# Patient Record
Sex: MALE | Race: WHITE | Employment: FULL TIME | URBAN - METROPOLITAN AREA
[De-identification: names, ages, dates, MRNs, and addresses within clinical notes are randomized per-mention and may not be internally consistent; named-entity substitution may affect disease eponyms.]

---

## 2017-08-08 LAB
AVERAGE GLUCOSE: 108
BUN BLDV-MCNC: 14 MG/DL
CALCIUM SERPL-MCNC: 9.3 MG/DL
CHLORIDE BLD-SCNC: 104 MMOL/L
CHOLESTEROL, TOTAL: 116 MG/DL
CHOLESTEROL/HDL RATIO: NORMAL
CO2: 28 MMOL/L
CREAT SERPL-MCNC: 0.96 MG/DL
GFR CALCULATED: NORMAL
GLUCOSE BLD-MCNC: 91 MG/DL
HBA1C MFR BLD: 5.4 %
HCT VFR BLD CALC: 42 % (ref 41–53)
HDLC SERPL-MCNC: 51 MG/DL (ref 35–70)
HEMOGLOBIN: 14.1 G/DL (ref 13.5–17.5)
LDL CHOLESTEROL CALCULATED: 53 MG/DL (ref 0–160)
PLATELET # BLD: 201 K/ΜL
POTASSIUM SERPL-SCNC: 4.4 MMOL/L
SODIUM BLD-SCNC: 140 MMOL/L
TRIGL SERPL-MCNC: 60 MG/DL
VLDLC SERPL CALC-MCNC: NORMAL MG/DL
WBC # BLD: 5.4 10^3/ML

## 2017-10-03 RX ORDER — ASPIRIN 81 MG/1
TABLET ORAL
Qty: 90 TABLET | Refills: 3 | Status: SHIPPED | OUTPATIENT
Start: 2017-10-03 | End: 2018-07-12 | Stop reason: SDUPTHER

## 2018-01-08 RX ORDER — METOPROLOL SUCCINATE 25 MG/1
25 TABLET, EXTENDED RELEASE ORAL DAILY
Qty: 30 TABLET | Refills: 0 | Status: SHIPPED | OUTPATIENT
Start: 2018-01-08 | End: 2018-01-29 | Stop reason: SDUPTHER

## 2018-01-30 RX ORDER — ATORVASTATIN CALCIUM 40 MG/1
TABLET, FILM COATED ORAL
Qty: 90 TABLET | Refills: 3 | Status: SHIPPED | OUTPATIENT
Start: 2018-01-30 | End: 2018-07-12 | Stop reason: SDUPTHER

## 2018-02-23 ENCOUNTER — OFFICE VISIT (OUTPATIENT)
Dept: CARDIOLOGY CLINIC | Age: 53
End: 2018-02-23

## 2018-02-23 VITALS
OXYGEN SATURATION: 95 % | BODY MASS INDEX: 24.52 KG/M2 | HEIGHT: 72 IN | WEIGHT: 181 LBS | SYSTOLIC BLOOD PRESSURE: 110 MMHG | DIASTOLIC BLOOD PRESSURE: 76 MMHG | HEART RATE: 65 BPM

## 2018-02-23 DIAGNOSIS — I10 ESSENTIAL HYPERTENSION, BENIGN: ICD-10-CM

## 2018-02-23 DIAGNOSIS — E78.2 MIXED HYPERLIPIDEMIA: ICD-10-CM

## 2018-02-23 DIAGNOSIS — I25.10 CORONARY ARTERY DISEASE INVOLVING NATIVE CORONARY ARTERY OF NATIVE HEART WITHOUT ANGINA PECTORIS: Primary | ICD-10-CM

## 2018-02-23 PROCEDURE — 93000 ELECTROCARDIOGRAM COMPLETE: CPT | Performed by: INTERNAL MEDICINE

## 2018-02-23 PROCEDURE — 99214 OFFICE O/P EST MOD 30 MIN: CPT | Performed by: INTERNAL MEDICINE

## 2018-02-23 NOTE — LETTER
[address] Cardiology - [facility] Adam Newman. [address] [facility] Hospital[facility]qu[facility] [place] [address]  Phone: [phone]  Fax: [phone]    Suzi Parry MD        February 26, 2018     MARCELO WEBB  [address]    Patient: Governor Garcia  MR Number: [id number]  YOB: 1965  Date of Visit: 2/23/2018    Dear Dr. Carmen Goldstein    Referring Provider:  [place]     Chief Complaint   Patient presents with    Coronary Artery Disease        History of Present Illness:  Mr Latasha Worley is here for his cardiology follow up visit. Feeling well. Taking meds. Taking CoQ10. Remains vegan diet. Tolerates activity/exercise well w/o symptoms. Denies CP, palps, syncope, dizziness, SOB or edema. Past Medical History:   has a past medical history of CAD (coronary artery disease); Hyperlipidemia; Hypertension; and Peripheral vascular disease ([address] Surgical History:   has a past surgical history that includes Coronary angioplasty with stent. Social History:   reports that he has never smoked. He has never used smokeless tobacco. He reports that he drinks alcohol. He reports that he does not use drugs. Family History:  family history includes Heart Disease in his paternal uncle, paternal uncle, and paternal uncle; Heart Disease (age of onset: 52) in his father. Home Medications:  Prior to Admission medications    Medication Sig Start Date End Date Taking? Authorizing Provider   aspirin 81 MG EC tablet Take 81 mg by mouth daily. Yes Historical Provider, MD   prasugrel (EFFIENT) 5 MG TABS Take 5 mg by mouth daily. Yes Historical Provider, MD   metoprolol (LOPRESSOR) 25 MG tablet Take 1 tablet by mouth 2 times daily.  12/5/11 12/4/12 Yes Hank Betts MD   atorvastatin (LIPITOR) 80 MG tablet Take 1/2 tablet daily 8/29/11  Yes Suzi Parry MD        Allergies: Review of patient's allergies indicates no known allergies. Review of Systems:   · Constitutional: there has been no unanticipated weight loss. There's been no change in energy level, sleep pattern, or activity level. · Eyes: No visual changes or diplopia. No scleral icterus. · ENT: No Headaches, hearing loss or vertigo. No mouth sores or sore throat. · Cardiovascular: Reviewed in HPI  · Respiratory: No cough or wheezing, no sputum production. No hematemesis. · Gastrointestinal: No abdominal pain, appetite loss, blood in stools. No change in bowel or bladder habits. · Genitourinary: No dysuria, trouble voiding, or hematuria. · Musculoskeletal:  No gait disturbance, weakness or joint complaints. · Integumentary: No rash or pruritis. · Neurological: No headache, diplopia, change in muscle strength, numbness or tingling. No change in gait, balance, coordination, mood, affect, memory, mentation, behavior. · Psychiatric: No anxiety, no depression. · Endocrine: No malaise, fatigue or temperature intolerance. No excessive thirst, fluid intake, or urination. No tremor. · Hematologic/Lymphatic: No abnormal bruising or bleeding, blood clots or swollen lymph nodes. · Allergic/Immunologic: No nasal congestion or hives. Physical Examination:    Vitals:    02/23/18 0740   BP: 110/76   Pulse: 65   SpO2: 95%        Constitutional and General Appearance: NAD   Respiratory:  · Normal excursion and expansion without use of accessory muscles  · Resp Auscultation: Normal breath sounds without dullness  Cardiovascular:  · The apical impulses not displaced  · Heart tones are crisp and normal  · Cervical veins are not engorged  · The carotid upstroke is normal in amplitude and contour without delay or bruit  · Normal S1S2, No S3, No Murmur  · Peripheral pulses are symmetrical and full  · There is no clubbing, cyanosis of the extremities.   · No edema  · Femoral Arteries: 2+ and equal  · Pedal Pulses: 2+ and equal

## 2018-02-23 NOTE — PROGRESS NOTES
or wheezing, no sputum production. No hematemesis. · Gastrointestinal: No abdominal pain, appetite loss, blood in stools. No change in bowel or bladder habits. · Genitourinary: No dysuria, trouble voiding, or hematuria. · Musculoskeletal:  No gait disturbance, weakness or joint complaints. · Integumentary: No rash or pruritis. · Neurological: No headache, diplopia, change in muscle strength, numbness or tingling. No change in gait, balance, coordination, mood, affect, memory, mentation, behavior. · Psychiatric: No anxiety, no depression. · Endocrine: No malaise, fatigue or temperature intolerance. No excessive thirst, fluid intake, or urination. No tremor. · Hematologic/Lymphatic: No abnormal bruising or bleeding, blood clots or swollen lymph nodes. · Allergic/Immunologic: No nasal congestion or hives. Physical Examination:    Vitals:    02/23/18 0740   BP: 110/76   Pulse: 65   SpO2: 95%        Constitutional and General Appearance: NAD   Respiratory:  · Normal excursion and expansion without use of accessory muscles  · Resp Auscultation: Normal breath sounds without dullness  Cardiovascular:  · The apical impulses not displaced  · Heart tones are crisp and normal  · Cervical veins are not engorged  · The carotid upstroke is normal in amplitude and contour without delay or bruit  · Normal S1S2, No S3, No Murmur  · Peripheral pulses are symmetrical and full  · There is no clubbing, cyanosis of the extremities. · No edema  · Femoral Arteries: 2+ and equal  · Pedal Pulses: 2+ and equal   Abdomen:  · No masses or tenderness  · Liver/Spleen: No Abnormalities Noted  Neurological/Psychiatric:  · Alert and oriented in all spheres  · Moves all extremities well  · Exhibits normal gait balance and coordination  · No abnormalities of mood, affect, memory, mentation, or behavior are noted      Assessment: Jovana Cleveland is doing well from cardiac standpoint. He continues to exercise and also follows a Vegan diet .     1.

## 2018-02-23 NOTE — PATIENT INSTRUCTIONS
Plan:    Check BP intermittently at home  Continue exercise regimen   Continue meds   Follow up in 12 months

## 2018-02-26 NOTE — COMMUNICATION BODY
[address]   Cardiac Followup    Referring Provider:  [facility]     Chief Complaint   Patient presents with    Coronary Artery Disease        History of Present Illness:  Mr Roe Morgan is here for his cardiology follow up visit. Feeling well. Taking meds. Taking CoQ10. Remains vegan diet. Tolerates activity/exercise well w/o symptoms. Denies CP, palps, syncope, dizziness, SOB or edema. Past Medical History:   has a past medical history of CAD (coronary artery disease); Hyperlipidemia; Hypertension; and Peripheral vascular disease ([address] Surgical History:   has a past surgical history that includes Coronary angioplasty with stent. Social History:   reports that he has never smoked. He has never used smokeless tobacco. He reports that he drinks alcohol. He reports that he does not use drugs. Family History:  family history includes Heart Disease in his paternal uncle, paternal uncle, and paternal uncle; Heart Disease (age of onset: 52) in his father. Home Medications:  Prior to Admission medications    Medication Sig Start Date End Date Taking? Authorizing Provider   aspirin 81 MG EC tablet Take 81 mg by mouth daily. Yes Historical Provider, MD   prasugrel (EFFIENT) 5 MG TABS Take 5 mg by mouth daily. Yes Historical Provider, MD   metoprolol (LOPRESSOR) 25 MG tablet Take 1 tablet by mouth 2 times daily. 12/5/11 12/4/12 Yes Carlos Tucker MD   atorvastatin (LIPITOR) 80 MG tablet Take 1/2 tablet daily 8/29/11  Yes Noreen Lipscomb MD        Allergies:  Review of patient's allergies indicates no known allergies. Review of Systems:   · Constitutional: there has been no unanticipated weight loss. There's been no change in energy level, sleep pattern, or activity level. · Eyes: No visual changes or diplopia. No scleral icterus. · ENT: No Headaches, hearing loss or vertigo. No mouth sores or sore throat.   · Cardiovascular: Reviewed in HPI  · Respiratory: No cough

## 2018-07-12 RX ORDER — METOPROLOL SUCCINATE 25 MG/1
25 TABLET, EXTENDED RELEASE ORAL DAILY
Qty: 90 TABLET | Refills: 3 | Status: SHIPPED | OUTPATIENT
Start: 2018-07-12 | End: 2018-07-18 | Stop reason: SDUPTHER

## 2018-07-12 RX ORDER — ASPIRIN 81 MG/1
TABLET ORAL
Qty: 90 TABLET | Refills: 3 | Status: SHIPPED | OUTPATIENT
Start: 2018-07-12 | End: 2018-07-18 | Stop reason: SDUPTHER

## 2018-07-12 RX ORDER — CLOPIDOGREL BISULFATE 75 MG/1
75 TABLET ORAL DAILY
Qty: 90 TABLET | Refills: 3 | Status: SHIPPED | OUTPATIENT
Start: 2018-07-12 | End: 2018-07-18 | Stop reason: SDUPTHER

## 2018-07-12 RX ORDER — ATORVASTATIN CALCIUM 40 MG/1
TABLET, FILM COATED ORAL
Qty: 90 TABLET | Refills: 3 | Status: SHIPPED | OUTPATIENT
Start: 2018-07-12 | End: 2018-07-18 | Stop reason: SDUPTHER

## 2018-07-18 ENCOUNTER — TELEPHONE (OUTPATIENT)
Dept: CARDIOLOGY CLINIC | Age: 53
End: 2018-07-18

## 2018-07-18 RX ORDER — METOPROLOL SUCCINATE 25 MG/1
25 TABLET, EXTENDED RELEASE ORAL DAILY
Qty: 90 TABLET | Refills: 3 | Status: SHIPPED | OUTPATIENT
Start: 2018-07-18 | End: 2018-07-18 | Stop reason: SDUPTHER

## 2018-07-18 RX ORDER — ATORVASTATIN CALCIUM 40 MG/1
TABLET, FILM COATED ORAL
Qty: 90 TABLET | Refills: 3 | Status: SHIPPED | OUTPATIENT
Start: 2018-07-18 | End: 2019-07-24 | Stop reason: SDUPTHER

## 2018-07-18 RX ORDER — ATORVASTATIN CALCIUM 40 MG/1
TABLET, FILM COATED ORAL
Qty: 90 TABLET | Refills: 3 | Status: SHIPPED | OUTPATIENT
Start: 2018-07-18 | End: 2018-07-18 | Stop reason: SDUPTHER

## 2018-07-18 RX ORDER — CLOPIDOGREL BISULFATE 75 MG/1
75 TABLET ORAL DAILY
Qty: 90 TABLET | Refills: 3 | Status: SHIPPED | OUTPATIENT
Start: 2018-07-18 | End: 2019-07-22 | Stop reason: SDUPTHER

## 2018-07-18 RX ORDER — METOPROLOL SUCCINATE 25 MG/1
25 TABLET, EXTENDED RELEASE ORAL DAILY
Qty: 90 TABLET | Refills: 3 | Status: SHIPPED | OUTPATIENT
Start: 2018-07-18 | End: 2019-07-31 | Stop reason: SDUPTHER

## 2018-07-18 RX ORDER — ASPIRIN 81 MG/1
TABLET ORAL
Qty: 90 TABLET | Refills: 3 | Status: SHIPPED | OUTPATIENT
Start: 2018-07-18 | End: 2019-06-24 | Stop reason: SDUPTHER

## 2018-07-18 RX ORDER — CLOPIDOGREL BISULFATE 75 MG/1
75 TABLET ORAL DAILY
Qty: 90 TABLET | Refills: 3 | Status: SHIPPED | OUTPATIENT
Start: 2018-07-18 | End: 2018-07-18 | Stop reason: SDUPTHER

## 2018-07-18 NOTE — TELEPHONE ENCOUNTER
[facility] states that the 4 prescriptions that were called into [facility] on 7/12 need to be sent to [facility]. His insurance will not allow him to  at [facility] anymore.

## 2019-02-25 ENCOUNTER — OFFICE VISIT (OUTPATIENT)
Dept: CARDIOLOGY CLINIC | Age: 54
End: 2019-02-25
Payer: COMMERCIAL

## 2019-02-25 VITALS
HEIGHT: 73 IN | WEIGHT: 180.6 LBS | SYSTOLIC BLOOD PRESSURE: 120 MMHG | OXYGEN SATURATION: 98 % | HEART RATE: 52 BPM | DIASTOLIC BLOOD PRESSURE: 78 MMHG | BODY MASS INDEX: 23.94 KG/M2

## 2019-02-25 DIAGNOSIS — I10 ESSENTIAL HYPERTENSION, BENIGN: ICD-10-CM

## 2019-02-25 DIAGNOSIS — I25.10 CORONARY ARTERY DISEASE INVOLVING NATIVE CORONARY ARTERY OF NATIVE HEART WITHOUT ANGINA PECTORIS: Primary | ICD-10-CM

## 2019-02-25 DIAGNOSIS — E78.2 MIXED HYPERLIPIDEMIA: ICD-10-CM

## 2019-02-25 PROCEDURE — 99214 OFFICE O/P EST MOD 30 MIN: CPT | Performed by: INTERNAL MEDICINE

## 2019-06-24 RX ORDER — ASPIRIN 81 MG/1
TABLET ORAL
Qty: 90 TABLET | Refills: 3 | Status: SHIPPED | OUTPATIENT
Start: 2019-06-24 | End: 2019-07-31 | Stop reason: SDUPTHER

## 2019-06-24 RX ORDER — METOPROLOL SUCCINATE 25 MG/1
TABLET, EXTENDED RELEASE ORAL
Qty: 90 TABLET | Refills: 3 | Status: SHIPPED | OUTPATIENT
Start: 2019-06-24 | End: 2020-07-14

## 2019-06-24 RX ORDER — CLOPIDOGREL BISULFATE 75 MG/1
TABLET ORAL
Qty: 90 TABLET | Refills: 3 | Status: SHIPPED | OUTPATIENT
Start: 2019-06-24 | End: 2019-07-31 | Stop reason: SDUPTHER

## 2019-07-22 RX ORDER — CLOPIDOGREL BISULFATE 75 MG/1
75 TABLET ORAL DAILY
Qty: 15 TABLET | Refills: 0 | Status: SHIPPED | OUTPATIENT
Start: 2019-07-22 | End: 2020-07-14

## 2019-07-24 RX ORDER — ATORVASTATIN CALCIUM 40 MG/1
TABLET, FILM COATED ORAL
Qty: 90 TABLET | Refills: 1 | Status: SHIPPED | OUTPATIENT
Start: 2019-07-24 | End: 2020-03-11

## 2019-07-31 RX ORDER — ASPIRIN 81 MG/1
TABLET ORAL
Qty: 90 TABLET | Refills: 3 | Status: SHIPPED | OUTPATIENT
Start: 2019-07-31 | End: 2020-07-14

## 2019-07-31 RX ORDER — CLOPIDOGREL BISULFATE 75 MG/1
TABLET ORAL
Qty: 90 TABLET | Refills: 3 | Status: SHIPPED | OUTPATIENT
Start: 2019-07-31 | End: 2020-07-14

## 2019-07-31 RX ORDER — METOPROLOL SUCCINATE 25 MG/1
25 TABLET, EXTENDED RELEASE ORAL DAILY
Qty: 90 TABLET | Refills: 3 | Status: SHIPPED | OUTPATIENT
Start: 2019-07-31 | End: 2020-07-14

## 2020-03-11 ENCOUNTER — TELEPHONE (OUTPATIENT)
Dept: CARDIOLOGY CLINIC | Age: 55
End: 2020-03-11

## 2020-03-11 RX ORDER — ATORVASTATIN CALCIUM 40 MG/1
TABLET, FILM COATED ORAL
Qty: 90 TABLET | Refills: 0 | Status: SHIPPED | OUTPATIENT
Start: 2020-03-11 | End: 2020-06-09

## 2020-03-11 NOTE — TELEPHONE ENCOUNTER
2/25/2019 [facility]  Plan:    Lipids per PCP   Check blood pressure intermittently at home  Continue exercise regimen and follow a healthy diet   Continue current medications   Follow up in 12 months      No upcoming appt.

## 2020-06-09 RX ORDER — ATORVASTATIN CALCIUM 40 MG/1
TABLET, FILM COATED ORAL
Qty: 90 TABLET | Refills: 1 | Status: SHIPPED | OUTPATIENT
Start: 2020-06-09 | End: 2021-02-01

## 2020-07-14 ENCOUNTER — TELEPHONE (OUTPATIENT)
Dept: CARDIOLOGY CLINIC | Age: 55
End: 2020-07-14

## 2020-07-14 NOTE — TELEPHONE ENCOUNTER
7/14 - called and LMOVM to schedule yearly appt with [facility] for further refills

## 2020-09-15 ENCOUNTER — HOSPITAL ENCOUNTER (INPATIENT)
Dept: CARDIAC CATH/INVASIVE PROCEDURES | Age: 55
LOS: 1 days | Discharge: HOME | DRG: 251 | End: 2020-09-16
Attending: INTERNAL MEDICINE | Admitting: INTERNAL MEDICINE
Payer: COMMERCIAL

## 2020-09-15 LAB
A/G RATIO: 1.8 (ref 1.1–2.2)
ALBUMIN SERPL-MCNC: 4.7 G/DL (ref 3.4–5)
ALP BLD-CCNC: 68 U/L (ref 40–129)
ALT SERPL-CCNC: 16 U/L (ref 10–40)
ANION GAP SERPL CALCULATED.3IONS-SCNC: 8 MMOL/L (ref 3–16)
AST SERPL-CCNC: 23 U/L (ref 15–37)
BILIRUB SERPL-MCNC: 0.8 MG/DL (ref 0–1)
BUN BLDV-MCNC: 19 MG/DL (ref 7–20)
CALCIUM SERPL-MCNC: 9.6 MG/DL (ref 8.3–10.6)
CHLORIDE BLD-SCNC: 105 MMOL/L (ref 99–110)
CHOLESTEROL, TOTAL: 125 MG/DL (ref 0–199)
CO2: 28 MMOL/L (ref 21–32)
CREAT SERPL-MCNC: 1 MG/DL (ref 0.9–1.3)
EKG ATRIAL RATE: 51 BPM
EKG DIAGNOSIS: NORMAL
EKG P AXIS: 47 DEGREES
EKG P-R INTERVAL: 156 MS
EKG Q-T INTERVAL: 452 MS
EKG QRS DURATION: 100 MS
EKG QTC CALCULATION (BAZETT): 416 MS
EKG R AXIS: 11 DEGREES
EKG T AXIS: 30 DEGREES
EKG VENTRICULAR RATE: 51 BPM
GFR AFRICAN AMERICAN: >60
GFR NON-AFRICAN AMERICAN: >60
GLOBULIN: 2.6 G/DL
GLUCOSE BLD-MCNC: 101 MG/DL (ref 70–99)
HCT VFR BLD CALC: 42.3 % (ref 40.5–52.5)
HDLC SERPL-MCNC: 71 MG/DL (ref 40–60)
HEMOGLOBIN: 14.4 G/DL (ref 13.5–17.5)
INR BLD: 1.03 (ref 0.86–1.14)
LDL CHOLESTEROL CALCULATED: 41 MG/DL
MCH RBC QN AUTO: 31.1 PG (ref 26–34)
MCHC RBC AUTO-ENTMCNC: 34 G/DL (ref 31–36)
MCV RBC AUTO: 91.4 FL (ref 80–100)
PDW BLD-RTO: 13.2 % (ref 12.4–15.4)
PLATELET # BLD: 213 K/UL (ref 135–450)
PMV BLD AUTO: 7.3 FL (ref 5–10.5)
POTASSIUM SERPL-SCNC: 4.4 MMOL/L (ref 3.5–5.1)
PROTHROMBIN TIME: 12 SEC (ref 10–13.2)
RBC # BLD: 4.63 M/UL (ref 4.2–5.9)
SODIUM BLD-SCNC: 141 MMOL/L (ref 136–145)
TOTAL PROTEIN: 7.3 G/DL (ref 6.4–8.2)
TRIGL SERPL-MCNC: 64 MG/DL (ref 0–150)
VLDLC SERPL CALC-MCNC: 13 MG/DL
WBC # BLD: 4.8 K/UL (ref 4–11)

## 2020-09-15 PROCEDURE — 2720000010 HC SURG SUPPLY STERILE

## 2020-09-15 PROCEDURE — C1894 INTRO/SHEATH, NON-LASER: HCPCS

## 2020-09-15 PROCEDURE — G0378 HOSPITAL OBSERVATION PER HR: HCPCS

## 2020-09-15 PROCEDURE — 6360000004 HC RX CONTRAST MEDICATION

## 2020-09-15 PROCEDURE — C1769 GUIDE WIRE: HCPCS

## 2020-09-15 PROCEDURE — 2580000003 HC RX 258

## 2020-09-15 PROCEDURE — 80061 LIPID PANEL: CPT

## 2020-09-15 PROCEDURE — 93010 ELECTROCARDIOGRAM REPORT: CPT | Performed by: INTERNAL MEDICINE

## 2020-09-15 PROCEDURE — 93458 L HRT ARTERY/VENTRICLE ANGIO: CPT | Performed by: INTERNAL MEDICINE

## 2020-09-15 PROCEDURE — C1887 CATHETER, GUIDING: HCPCS

## 2020-09-15 PROCEDURE — C1725 CATH, TRANSLUMIN NON-LASER: HCPCS

## 2020-09-15 PROCEDURE — 85027 COMPLETE CBC AUTOMATED: CPT

## 2020-09-15 PROCEDURE — 2060000000 HC ICU INTERMEDIATE R&B

## 2020-09-15 PROCEDURE — 4A023N7 MEASUREMENT OF CARDIAC SAMPLING AND PRESSURE, LEFT HEART, PERCUTANEOUS APPROACH: ICD-10-PCS | Performed by: INTERNAL MEDICINE

## 2020-09-15 PROCEDURE — 2500000003 HC RX 250 WO HCPCS

## 2020-09-15 PROCEDURE — 80053 COMPREHEN METABOLIC PANEL: CPT

## 2020-09-15 PROCEDURE — 92920 PRQ TRLUML C ANGIOP 1ART&/BR: CPT

## 2020-09-15 PROCEDURE — 6370000000 HC RX 637 (ALT 250 FOR IP)

## 2020-09-15 PROCEDURE — 92978 ENDOLUMINL IVUS OCT C 1ST: CPT

## 2020-09-15 PROCEDURE — 85610 PROTHROMBIN TIME: CPT

## 2020-09-15 PROCEDURE — 92920 PRQ TRLUML C ANGIOP 1ART&/BR: CPT | Performed by: INTERNAL MEDICINE

## 2020-09-15 PROCEDURE — B240ZZ3 ULTRASONOGRAPHY OF SINGLE CORONARY ARTERY, INTRAVASCULAR: ICD-10-PCS | Performed by: INTERNAL MEDICINE

## 2020-09-15 PROCEDURE — 93458 L HRT ARTERY/VENTRICLE ANGIO: CPT

## 2020-09-15 PROCEDURE — B2151ZZ FLUOROSCOPY OF LEFT HEART USING LOW OSMOLAR CONTRAST: ICD-10-PCS | Performed by: INTERNAL MEDICINE

## 2020-09-15 PROCEDURE — 2709999900 HC NON-CHARGEABLE SUPPLY

## 2020-09-15 PROCEDURE — B2111ZZ FLUOROSCOPY OF MULTIPLE CORONARY ARTERIES USING LOW OSMOLAR CONTRAST: ICD-10-PCS | Performed by: INTERNAL MEDICINE

## 2020-09-15 PROCEDURE — 6360000002 HC RX W HCPCS

## 2020-09-15 PROCEDURE — 93005 ELECTROCARDIOGRAM TRACING: CPT | Performed by: INTERNAL MEDICINE

## 2020-09-15 PROCEDURE — 02703ZZ DILATION OF CORONARY ARTERY, ONE ARTERY, PERCUTANEOUS APPROACH: ICD-10-PCS | Performed by: INTERNAL MEDICINE

## 2020-09-15 RX ORDER — SODIUM CHLORIDE 0.9 % (FLUSH) 0.9 %
10 SYRINGE (ML) INJECTION EVERY 12 HOURS SCHEDULED
Status: CANCELLED | OUTPATIENT
Start: 2020-09-15

## 2020-09-15 RX ORDER — MIDAZOLAM HYDROCHLORIDE 5 MG/ML
INJECTION INTRAMUSCULAR; INTRAVENOUS
Status: COMPLETED | OUTPATIENT
Start: 2020-09-15 | End: 2020-09-15

## 2020-09-15 RX ORDER — SODIUM CHLORIDE 9 MG/ML
INJECTION, SOLUTION INTRAVENOUS CONTINUOUS
Status: CANCELLED | OUTPATIENT
Start: 2020-09-15

## 2020-09-15 RX ORDER — ACETAMINOPHEN 325 MG/1
650 TABLET ORAL EVERY 4 HOURS PRN
Status: DISCONTINUED | OUTPATIENT
Start: 2020-09-15 | End: 2020-09-16 | Stop reason: HOSPADM

## 2020-09-15 RX ORDER — SODIUM CHLORIDE 9 MG/ML
INJECTION, SOLUTION INTRAVENOUS ONCE
Status: DISCONTINUED | OUTPATIENT
Start: 2020-09-15 | End: 2020-09-16 | Stop reason: HOSPADM

## 2020-09-15 RX ORDER — CLOPIDOGREL 300 MG/1
TABLET, FILM COATED ORAL
Status: COMPLETED | OUTPATIENT
Start: 2020-09-15 | End: 2020-09-15

## 2020-09-15 RX ORDER — METOPROLOL SUCCINATE 25 MG/1
25 TABLET, EXTENDED RELEASE ORAL DAILY
Status: DISCONTINUED | OUTPATIENT
Start: 2020-09-15 | End: 2020-09-16 | Stop reason: HOSPADM

## 2020-09-15 RX ORDER — ASPIRIN 81 MG/1
81 TABLET ORAL DAILY
Status: DISCONTINUED | OUTPATIENT
Start: 2020-09-15 | End: 2020-09-16 | Stop reason: HOSPADM

## 2020-09-15 RX ORDER — CLOPIDOGREL BISULFATE 75 MG/1
75 TABLET ORAL DAILY
Status: DISCONTINUED | OUTPATIENT
Start: 2020-09-15 | End: 2020-09-16 | Stop reason: HOSPADM

## 2020-09-15 RX ORDER — ATORVASTATIN CALCIUM 40 MG/1
40 TABLET, FILM COATED ORAL DAILY
Status: DISCONTINUED | OUTPATIENT
Start: 2020-09-15 | End: 2020-09-16 | Stop reason: HOSPADM

## 2020-09-15 RX ORDER — MORPHINE SULFATE 4 MG/ML
4 INJECTION, SOLUTION INTRAMUSCULAR; INTRAVENOUS
Status: DISCONTINUED | OUTPATIENT
Start: 2020-09-15 | End: 2020-09-16 | Stop reason: HOSPADM

## 2020-09-15 RX ORDER — FENTANYL CITRATE 50 UG/ML
INJECTION, SOLUTION INTRAMUSCULAR; INTRAVENOUS
Status: COMPLETED | OUTPATIENT
Start: 2020-09-15 | End: 2020-09-15

## 2020-09-15 RX ORDER — MORPHINE SULFATE 2 MG/ML
2 INJECTION, SOLUTION INTRAMUSCULAR; INTRAVENOUS
Status: DISCONTINUED | OUTPATIENT
Start: 2020-09-15 | End: 2020-09-16 | Stop reason: HOSPADM

## 2020-09-15 RX ORDER — ONDANSETRON 2 MG/ML
4 INJECTION INTRAMUSCULAR; INTRAVENOUS EVERY 6 HOURS PRN
Status: DISCONTINUED | OUTPATIENT
Start: 2020-09-15 | End: 2020-09-16 | Stop reason: HOSPADM

## 2020-09-15 RX ORDER — ZOLPIDEM TARTRATE 5 MG/1
5 TABLET ORAL NIGHTLY PRN
Status: DISCONTINUED | OUTPATIENT
Start: 2020-09-15 | End: 2020-09-16 | Stop reason: HOSPADM

## 2020-09-15 RX ORDER — SODIUM CHLORIDE 0.9 % (FLUSH) 0.9 %
10 SYRINGE (ML) INJECTION PRN
Status: CANCELLED | OUTPATIENT
Start: 2020-09-15

## 2020-09-15 RX ADMIN — MIDAZOLAM HYDROCHLORIDE 2 MG: 5 INJECTION INTRAMUSCULAR; INTRAVENOUS at 12:52

## 2020-09-15 RX ADMIN — FENTANYL CITRATE 25 MCG: 50 INJECTION, SOLUTION INTRAMUSCULAR; INTRAVENOUS at 13:14

## 2020-09-15 RX ADMIN — FENTANYL CITRATE 25 MCG: 50 INJECTION, SOLUTION INTRAMUSCULAR; INTRAVENOUS at 13:05

## 2020-09-15 RX ADMIN — FENTANYL CITRATE 50 MCG: 50 INJECTION, SOLUTION INTRAMUSCULAR; INTRAVENOUS at 13:27

## 2020-09-15 RX ADMIN — MIDAZOLAM HYDROCHLORIDE 1 MG: 5 INJECTION INTRAMUSCULAR; INTRAVENOUS at 13:27

## 2020-09-15 RX ADMIN — CLOPIDOGREL 600 MG: 300 TABLET, FILM COATED ORAL at 13:30

## 2020-09-15 RX ADMIN — MIDAZOLAM HYDROCHLORIDE 1 MG: 5 INJECTION INTRAMUSCULAR; INTRAVENOUS at 13:14

## 2020-09-15 RX ADMIN — FENTANYL CITRATE 50 MCG: 50 INJECTION, SOLUTION INTRAMUSCULAR; INTRAVENOUS at 12:52

## 2020-09-15 RX ADMIN — MIDAZOLAM HYDROCHLORIDE 1 MG: 5 INJECTION INTRAMUSCULAR; INTRAVENOUS at 13:05

## 2020-09-15 NOTE — PRE SEDATION
Brief Pre-Op Note/Sedation Assessment      Snow Velarde  1965  Cath Pool Rm/NONE      [id number]  1:50 PM    Planned Procedure: Cardiac Catheterization Procedure    Post Procedure Plan: Return to same level of care    Consent: I have discussed with the patient and/or the patient representative the indication, alternatives, and the possible risks and/or complications of the planned procedure and the anesthesia methods. The patient and/or patient representative appear to understand and agree to proceed.     Chief Complaint: Chest Pain/Pressure      Indications for Cath Procedure:  ACS > 24 hrs, New Onset Angina <= 2 months and Stable Known CAD  Anginal Classification within 2 weeks:  CCS III - Symptoms with everyday living activities, i.e., moderate limitation  NYHA Heart Failure Class within 2 weeks: No symptoms  Is Cath Lab Visit Valve-related?: No  Surgical Risk: N/A  Functional Type: >= 4 METS with symptoms    Anti- Anginal Meds within 2 weeks:   Yes: Aspirin and Statin (Any)    Stress or Imaging Studies Performed (within 6 months):  None     Vital Signs:  Ht 6' (1.829 m)   Wt 177 lb 12.8 oz (80.6 kg)   BMI 24.11 kg/m²     Allergies:  No Known Allergies    Past Medical History:  Past Medical History:   Diagnosis Date    CAD (coronary artery disease)     Hyperlipidemia     Hypertension     Peripheral vascular disease ([facility]          Surgical History:  Past Surgical History:   Procedure Laterality Date    CORONARY ANGIOPLASTY WITH STENT PLACEMENT  01/01/2010    Cypher CLARA LAD 3.5 x 13    CORONARY ANGIOPLASTY WITH STENT PLACEMENT  06/03/2014    Xience CLARA 3.5 x 8 LAD         Medications:  Current Facility-Administered Medications   Medication Dose Route Frequency Provider Last Rate Last Dose    0.9 % sodium chloride infusion   Intravenous Once Wilfrido Ham MD               Pre-Sedation:    Pre-Sedation Documentation and Exam:  I have personally completed a history, physical exam & review of systems for this patient (see notes). Prior History of Anesthesia Complications:   none    Modified Mallampati:  II (soft palate, uvula, fauces visible)    ASA Classification:  Class 2 - A normal healthy patient with mild systemic disease      Ellen Scale: Activity:  2 - Able to move 4 extremities voluntarily on command  Respiration:  2 - Able to breathe deeply and cough freely  Circulation:  2 - BP+/- 20mmHg of normal  Consciousness:  2 - Fully awake  Oxygen Saturation (color):  2 - Able to maintain oxygen saturation >92% on room air    Sedation/Anesthesia Plan:  Guard the patient's safety and welfare. Minimize physical discomfort and pain. Minimize negative psychological responses to treatment by providing sedation and analgesia and maximize the potential amnesia. Patient to meet pre-procedure discharge plan.     Medication Planned:  midazolam intravenously and fentanyl intravenously    Patient is an appropriate candidate for plan of sedation: yes      Electronically signed by Bhupendra Cruz MD on 9/15/2020 at 1:50 PM

## 2020-09-15 NOTE — FLOWSHEET NOTE
09/15/20 1736   Vital Signs   Temp 97.7 °F (36.5 °C)   Temp Source Oral   Pulse (!) 48   Heart Rate Source Monitor   Resp 16   BP (!) 150/88   BP Location Left upper arm   Level of Consciousness 0   MEWS Score 2   Patient Currently in Pain No   Oxygen Therapy   SpO2 98 %   O2 Device None (Room air)   Peripheral Vascular   Peripheral Vascular (WDL) WDL   Puncture Site Assessment 1   Location Radial - right   Site Assessment No redness, drainage, swelling or hematoma   Dressing Applied Transparent occlusive dressing   Patient arrived from Cath Lab.R radial site checked with Cath Lab nurse. Site intact, no bleeding, swelling or hematoma noted. Radiall pulse palpated. Pt alert and oriented to room. Pt knows not to use R arm.

## 2020-09-15 NOTE — POST SEDATION
Patient:  Lanre Stewart   :   1965    A pre-sedation re-evaluation was performed immediately at the end of the procedure. Procedure:  Cardiac cath  Medications: Procedural sedation with minimal conscious sedation  Complications: None  Estimated Blood Loss: none  Specimens: Were not obtained        [place] Medication and Procedural Reconciliation:  I agree that the documented medications and procedures performed are true. The medications were given under my order. The procedures were performed under my direct supervision.

## 2020-09-15 NOTE — PLAN OF CARE
Problem: Cardiac:  Goal: Ability to maintain an adequate cardiac output will improve  Description: Ability to maintain an adequate cardiac output will improve  Outcome: Ongoing

## 2020-09-15 NOTE — PROCEDURES
[facility]       Cardiac Catheterization Lab Report    PATIENT: Debbie Sainz  DATE: 9/15/2020  MRN: [id number]  CSN: [id number]  : 1965      Performing Physician: Elveria Cheadle, MD, [facility]  Primary Care Physician: Macy Hill  Admitting Provider: Britta Stout MD    Procedures Performed:   1. Left heart catheterization  2. Selective left and right coronary angiogram  3. Left ventriculography   4. Angiosculpt Cutting Balloon of the first diagonal ostium  5. OCT of the proximal LAD    Procedure Findings:  1.  70 to 80% stenosis of the origin of the first diagonal branch   ~Mild to moderate in-stent restenosis of the proximal LAD stent. 2. Normal left ventricular function with EF estimated at 55-60%  3. Normal left heart hemodynamics    Indications:   Patient Active Problem List   Diagnosis    Coronary artery disease involving native coronary artery of native heart with unstable angina pectoris ([address]    Mixed hyperlipidemia    Peripheral vascular disease ([address]    Essential hypertension, benign    Chest pain    Unstable angina ([address]       Details:   Debbie Sainz was brought to the cardiac catheterization lab in a fasting state after informed consent was obtained. If the patient was able to provide written consent, it was obtained. The patient's vitals were monitored through out the procedure. The patient was sedated using the appropriate levels of sedation and ASA guidelines. The appropriate access site area was prepped and drapped in a sterile fashion. The area was anesthetized with 2% lidocaine. Using the modified Seldinger technique, an arterial sheath was introduced into the arterial access site using a single anterior wall puncture. The sheath was flushed and prepped in usual fashion. Catheters used during the procedure included 5 Uzbek TIG 4.0 catheter. The catheters were advanced and removed over a .035\" wire, into the appropriate positions.  Multiple angiographic views were obtained. An LV gram was obtained. The interventional portion of the procedure was completed utilizing a 6 [place] system. XB 3.5 guide cath advanced in the left main coronary ostium. BMW wires advanced into the distal diagonal branch. We did a angioscope balloon angioplasty using a 2.5 mm x 10 mm balloon. We subsequently redirected the wire into the main LAD and the did OCT. Findings:    1. Left main coronary artery was normal. It gave off the left anterior descending artery and left circumflex. 2. Left anterior descending artery has previously placed stents within the ostial and proximal portion of the LAD. There are 2 stents noted here. The minimal surface area was 5.6 mm². Residual stenosis within the stent in the overlap segment is about 35%. The distal reference diameter is 3.5 mm diagonal origin was 2.5 mm.   ~The diagonal origin had a 70% stenosis. After balloon angioplasty the origin was 2.5 mm    3. Left circumflex was normal. It was moderate in size. There was a moderate sized obtuse marginal branch. 4. Right coronary artery was normal. It was moderate in size and was the dominant artery. 5. Left ventriculogram showed normal LVEF at 55-60%. Wall motion was normal . There was no significant mitral valve or aortic valve disease noted. LVEDP was normal. There was no gradient noted across the aortic valve during pullback of the catheter. Ht 6' (1.829 m)   Wt 177 lb 12.8 oz (80.6 kg)   BMI 24.11 kg/m²     The access site was controlled with manual pressure and/or appropriate closure device. Moderate Conscious Sedation Details: An independent trained observer pushed medications at my direction. We monitoring the patient's level of consciousness and vital signs/physiologic status throughout the procedure.   CPT codes 28702 and 35811      Start time: 1255  Stop time: 1335  ASA class: 2    Sedation totals:  Versad - 5mg  Fentanyl - 150mcg    EBL - minimal <5 cc

## 2020-09-15 NOTE — H&P
H&P Update    PATIENT: Agustina Bautista  DATE: 9/15/2020  MRN: [id number]  CSN: [id number]  : 1965    I have reviewed the history and physical and examined the patient and find no relevant changes. I have reviewed with the patient and/or family the risks, benefits, and alternatives to the procedure. History of present illness:    54 y.o. patient who presents to the hospital for invasive cardiac testing. The patient underwent clinical evaluation, and it was determined that the patient needed to undergo cardiac catheterization for further diagnostic evaluation. Pre-sedation Assessment    Patient:  Agustina Bautista   :   1965  Intended Procedures may include:   1. Left heart Catheterization with possible angioplasty/stent with associated supportive testing. 2. Right heart catheterization   3. Peripheral angiogram    [place] nurses notes reviewed and agreed. Medications reviewed  Allergies: No Known Allergies    Primary Care Doctor: MARCELO KURTZ [address] [place] [address]    Patient Active Problem List   Diagnosis    Coronary artery disease involving native coronary artery of native heart with unstable angina pectoris ([address]    Mixed hyperlipidemia    Peripheral vascular disease ([address]    Essential hypertension, benign    Chest pain    Unstable angina ([address]         Cardiac Testing: I have reviewed the findings below. EKG:  ECHO:   STRESS TEST:  CATH:  BYPASS:  VASCULAR:    Past Medical History:   has a past medical history of CAD (coronary artery disease), Hyperlipidemia, Hypertension, and Peripheral vascular disease ([address] Surgical History:   has a past surgical history that includes Coronary angioplasty with stent (2010) and Coronary angioplasty with stent (2014). Social History:   reports that he has never smoked. He has never used smokeless tobacco. He reports current alcohol use. He reports that he does not use drugs.      Family History:  No evidence for sudden cardiac death or premature CAD    Home Medications:  Reviewed and are listed in nursing record. and/or listed below  Current Facility-Administered Medications   Medication Dose Route Frequency Provider Last Rate Last Dose    0.9 % sodium chloride infusion   Intravenous Once Liberty Moran MD            Allergies:  Patient has no known allergies. Review of Systems:   All 14 point review of symptoms completed. Pertinent positives identified in the HPI, all other review of symptoms negative as below. Physical Examination:    There were no vitals filed for this visit. Weight: 177 lb 12.8 oz (80.6 kg)     Wt Readings from Last 3 Encounters:   09/15/20 177 lb 12.8 oz (80.6 kg)   02/25/19 180 lb 9.6 oz (81.9 kg)   02/23/18 181 lb (82.1 kg)     No intake/output data recorded. No intake/output data recorded.     General Appearance:  Alert, cooperative, no distress, appears stated age   Head:  Normocephalic, without obvious abnormality, atraumatic   Eyes:  PERRL, conjunctiva/corneas clear       Nose: Nares normal, no drainage or sinus tenderness   Throat: Lips, mucosa, and tongue normal   Neck: Supple, symmetrical, trachea midline, no adenopathy, thyroid: not enlarged, symmetric, no tenderness/mass/nodules, no carotid bruit or JVD       Lungs:   Clear to auscultation bilaterally, respirations unlabored   Chest Wall:  No tenderness or deformity   Heart:  Regular rhythm and normal rate; S1, S2 are normal; no murmur noted; no rub or gallop   Abdomen:   Soft, non-tender, bowel sounds active all four quadrants,  no masses, no organomegaly           Extremities: Extremities normal, atraumatic, no cyanosis or edema   Pulses: 2+ and symmetric   Skin: Skin color, texture, turgor normal, no rashes or lesions   Pysch: Normal mood and affect   Neurologic: Normal gross motor and sensory exam.         Labs  CBC:   Lab Results   Component Value Date    WBC 4.8 09/15/2020    RBC 4.63 09/15/2020    HGB 14.4 09/15/2020    HCT 42.3 09/15/2020    MCV 91.4 09/15/2020    RDW 13.2 09/15/2020     09/15/2020     CMP:    Lab Results   Component Value Date     09/15/2020    K 4.4 09/15/2020     09/15/2020    CO2 28 09/15/2020    BUN 19 09/15/2020    CREATININE 1.0 09/15/2020    GFRAA >60 09/15/2020    AGRATIO 1.8 09/15/2020    LABGLOM >60 09/15/2020    GLUCOSE 101 09/15/2020    PROT 7.3 09/15/2020    CALCIUM 9.6 09/15/2020    BILITOT 0.8 09/15/2020    ALKPHOS 68 09/15/2020    AST 23 09/15/2020    ALT 16 09/15/2020     PT/INR:  No results found for: PTINR  Lab Results   Component Value Date    TROPONINI <0.01 06/03/2014     No components found for: CHLPL  Lab Results   Component Value Date    TRIG 60 08/08/2017    TRIG 59 06/03/2014     Lab Results   Component Value Date    HDL 51 08/08/2017    HDL 56 06/03/2014     Lab Results   Component Value Date    LDLCALC 53 08/08/2017    LDLCALC 65 06/03/2014     Lab Results   Component Value Date    LABVLDL 12 06/03/2014     Lab Results   Component Value Date    ALT 16 09/15/2020    AST 23 09/15/2020    ALKPHOS 68 09/15/2020    BILITOT 0.8 09/15/2020       Assessment:  54 y.o. patient with:  1. Pre-procedure assessment for cardiac procedure. Active Problems:    Coronary artery disease involving native coronary artery of native heart with unstable angina pectoris (HCC)    Unstable angina (HCC)  Resolved Problems:    * No resolved hospital problems. *      Plan:  1. Proceed with planned procedure for further assessment. ~I had the opportunity to review the [facility] clinical presentation and the available pertinent data. With the patient's clinical risk factors and symptoms, there is a high pretest likelihood for CAD. I have asked [facility] to undergo cardiac angiography for further diagnostic testing. The procedure was explained in depth. All questions and alternate treatment strategies were discussed. The patient agrees to proceed.  They understand all the risks associated with the procedure, including myocardial infarction, stroke, death, vascular complications, and the possible need for emergent surgery. All questions and concerns were addressed to the patient/family. Alternatives to my treatment were discussed. The note was completed using EMR. Every effort was made to ensure accuracy; however, inadvertent computerized transcription errors may be present.     Bhupendra Cruz MD, [address] [place], [place]  [phone] [facility]  [phone] [facility]  9/15/2020  1:49 PM

## 2020-09-16 VITALS
DIASTOLIC BLOOD PRESSURE: 87 MMHG | SYSTOLIC BLOOD PRESSURE: 129 MMHG | RESPIRATION RATE: 18 BRPM | BODY MASS INDEX: 23.81 KG/M2 | OXYGEN SATURATION: 94 % | TEMPERATURE: 98.3 F | HEART RATE: 56 BPM | HEIGHT: 72 IN | WEIGHT: 175.8 LBS

## 2020-09-16 LAB
ANION GAP SERPL CALCULATED.3IONS-SCNC: 8 MMOL/L (ref 3–16)
BUN BLDV-MCNC: 13 MG/DL (ref 7–20)
CALCIUM SERPL-MCNC: 9.4 MG/DL (ref 8.3–10.6)
CHLORIDE BLD-SCNC: 106 MMOL/L (ref 99–110)
CO2: 27 MMOL/L (ref 21–32)
CREAT SERPL-MCNC: 1.1 MG/DL (ref 0.9–1.3)
EKG ATRIAL RATE: 52 BPM
EKG DIAGNOSIS: NORMAL
EKG P AXIS: 51 DEGREES
EKG P-R INTERVAL: 152 MS
EKG Q-T INTERVAL: 450 MS
EKG QRS DURATION: 94 MS
EKG QTC CALCULATION (BAZETT): 418 MS
EKG R AXIS: 10 DEGREES
EKG T AXIS: 41 DEGREES
EKG VENTRICULAR RATE: 52 BPM
GFR AFRICAN AMERICAN: >60
GFR NON-AFRICAN AMERICAN: >60
GLUCOSE BLD-MCNC: 101 MG/DL (ref 70–99)
HCT VFR BLD CALC: 42.7 % (ref 40.5–52.5)
HEMOGLOBIN: 14.4 G/DL (ref 13.5–17.5)
LV EF: 58 %
LVEF MODALITY: NORMAL
MCH RBC QN AUTO: 31 PG (ref 26–34)
MCHC RBC AUTO-ENTMCNC: 33.8 G/DL (ref 31–36)
MCV RBC AUTO: 91.7 FL (ref 80–100)
PDW BLD-RTO: 13 % (ref 12.4–15.4)
PLATELET # BLD: 198 K/UL (ref 135–450)
PMV BLD AUTO: 7.6 FL (ref 5–10.5)
POTASSIUM SERPL-SCNC: 4.6 MMOL/L (ref 3.5–5.1)
RBC # BLD: 4.66 M/UL (ref 4.2–5.9)
SODIUM BLD-SCNC: 141 MMOL/L (ref 136–145)
WBC # BLD: 6.4 K/UL (ref 4–11)

## 2020-09-16 PROCEDURE — 93010 ELECTROCARDIOGRAM REPORT: CPT | Performed by: INTERNAL MEDICINE

## 2020-09-16 PROCEDURE — 93005 ELECTROCARDIOGRAM TRACING: CPT | Performed by: INTERNAL MEDICINE

## 2020-09-16 PROCEDURE — 85027 COMPLETE CBC AUTOMATED: CPT

## 2020-09-16 PROCEDURE — 6370000000 HC RX 637 (ALT 250 FOR IP): Performed by: INTERNAL MEDICINE

## 2020-09-16 PROCEDURE — 80048 BASIC METABOLIC PNL TOTAL CA: CPT

## 2020-09-16 PROCEDURE — G0378 HOSPITAL OBSERVATION PER HR: HCPCS

## 2020-09-16 PROCEDURE — 99239 HOSP IP/OBS DSCHRG MGMT >30: CPT | Performed by: NURSE PRACTITIONER

## 2020-09-16 RX ORDER — METOPROLOL SUCCINATE 25 MG/1
12.5 TABLET, EXTENDED RELEASE ORAL DAILY
Qty: 90 TABLET | Refills: 1
Start: 2020-09-16 | End: 2021-09-13 | Stop reason: SDUPTHER

## 2020-09-16 RX ADMIN — ATORVASTATIN CALCIUM 40 MG: 40 TABLET, FILM COATED ORAL at 08:41

## 2020-09-16 RX ADMIN — ASPIRIN 81 MG: 81 TABLET, COATED ORAL at 08:41

## 2020-09-16 RX ADMIN — METOPROLOL SUCCINATE 25 MG: 25 TABLET, EXTENDED RELEASE ORAL at 08:40

## 2020-09-16 RX ADMIN — CLOPIDOGREL BISULFATE 75 MG: 75 TABLET ORAL at 08:41

## 2020-09-16 NOTE — FLOWSHEET NOTE
09/15/20 2039   Assessment   Charting Type Shift assessment   Neurological   Neuro (WDL) WDL   Level of Consciousness 0   [place] Coma Scale   Eye Opening 4   Best Verbal Response 5   Best Motor Response 6   [place] Coma Scale Score 15   HEENT   HEENT (WDL) WDL   Respiratory   Respiratory (WDL) WDL   Respiratory Pattern Regular   Respiratory Depth Normal   Respiratory Quality/Effort Unlabored   Chest Assessment Trachea midline; Chest expansion symmetrical   L Breath Sounds Clear   R Breath Sounds Clear   Cardiac   Cardiac (WDL) X   Cardiac Regularity Regular   Heart Sounds S1, S2   Cardiac Rhythm SB;NSR   Rhythm Interpretation   Pulse (!) 49   Gastrointestinal   Abdominal (WDL) WDL   RUQ Bowel Sounds Active   LUQ Bowel Sounds Active   RLQ Bowel Sounds Active   LLQ Bowel Sounds Active   Peripheral Vascular   Peripheral Vascular (WDL) WDL   RUE Neurovascular Assessment   R Radial Pulse +2   Puncture Site Assessment 1   Location Radial - right   Site Assessment No redness, drainage, swelling or hematoma   Skin Color/Condition   Skin Color/Condition (WDL) WDL   Skin Integrity   Skin Integrity (WDL) WDL   Musculoskeletal   Musculoskeletal (WDL) WDL   Genitourinary   Genitourinary (WDL) WDL   Anus/Rectum   Anus/Rectum (WDL) WDL   Psychosocial   Psychosocial (WDL) WDL

## 2020-09-16 NOTE — DISCHARGE SUMMARY
[facility]  Discharge Summary  Patient ID:  Tomas Brown  [id number] y.o. 1965    Admit date: 9/15/2020    Discharge date:  9/16/20     Admitting Provider: Michelle Hurtado MD     Discharge Provider: Yonis Russell     Admission Diagnoses: Coronary artery disease involving native coronary artery of native heart with unstable angina pectoris [facility]) [I25.110]    Discharge Diagnoses:   Patient Active Problem List   Diagnosis    Coronary artery disease involving native coronary artery of native heart with unstable angina pectoris ([facility]    Mixed hyperlipidemia    Peripheral vascular disease ([facility]    Essential hypertension, benign    Chest pain    Unstable angina ([facility]        Discharged Condition: good  The patient was seen and examined on day of discharge and this discharge summary is in conjunction with any daily progress note from day of discharge. Hospital Course: Tomas Brown was admitted directly from cath lab after outpatient left heart catheterization with PCI to [address]. He had been seen in the ED in [place] with chest pain, troponin's and ECG negative for ischemia. He was discharged to follow up here. He has hx of CAD with original PCI to LAD in 2010, followed by repeat stenting proximal to original LAD stent in 2014. He denies any further chest discomfort. Denies any lightheadedness, dizziness or fatigue. No complications overnight. Rhythm has been sinus mark 40-50's with isolated PVC's. Will trial decrease in metoprolol (Toprol XL) 25 mg to half tablet. Reviewed with Dr. Timothy Thibodeaux, concern of wall motion abnormality on LV gram (though questionable), will arrange for echocardiogram to further assess.      Consults:  IP CONSULT TO CARDIAC REHAB  Physical Exam:  /87   Pulse 56   Temp 98.3 °F (36.8 °C) (Oral)   Resp 18   Ht 6' (1.829 m)   Wt 175 lb 12.8 oz (79.7 kg)   SpO2 94%   BMI 23.84 kg/m²       Intake/Output Summary (Last 24 hours) at 9/16/2020 3741  Last data filed at 9/16/2020 0800  Gross per 24 hour   Intake 1040 ml   Output 1050 ml   Net -10 ml     General:  Awake, alert, NAD  Skin:  Warm and dry  Neck:  JVD<8  Chest:  Clear to auscultation, no wheezes/rhonchi/rales  Cardiovascular:  RRR S1S2, no m/g/r  Abdomen:  Soft, nontender, +bowel sounds  Extremities:  No BLE edema  right radial site without ooze, bruise or hematoma, dressing C,D,I, 2+ pulse     Significant Diagnostic Studies:   CARDIAC CATH/ PCI 9/15/20:   Procedures Performed:   1. Left heart catheterization  2. Selective left and right coronary angiogram  3. Left ventriculography   4. Angiosculpt Cutting Balloon of the first diagonal ostium  5. OCT of the proximal LAD   Procedure Findings:  1.  70 to 80% stenosis of the origin of the first diagonal branch              ~Mild to moderate in-stent restenosis of the proximal LAD stent. 2. Normal left ventricular function with EF estimated at 55-60%  3. Normal left heart hemodynamics   Findings:   1. Left main coronary artery was normal. It gave off the left anterior descending artery and left circumflex. 2. Left anterior descending artery has previously placed stents within the ostial and proximal portion of the LAD. There are 2 stents noted here. The minimal surface area was 5.6 mm². Residual stenosis within the stent in the overlap segment is about 35%. The distal reference diameter is 3.5 mm diagonal origin was 2.5 mm.              ~The diagonal origin had a 70% stenosis. After balloon angioplasty the origin was 2.5 mm  3. Left circumflex was normal. It was moderate in size. There was a moderate sized obtuse marginal branch. 4. Right coronary artery was normal. It was moderate in size and was the dominant artery. 5. Left ventriculogram showed normal LVEF at 55-60%. Wall motion was normal . There was no significant mitral valve or aortic valve disease noted.  LVEDP was normal. There was no gradient noted across the aortic valve during pullback of the catheter. CONCLUSIONS:   1. Successful Angiosculpt balloon angioplasty of the first diagonal branch  2. Mild to moderate ostial in-stent restenosis of prior LAD stents. ASSESSMENT/RECOMMENDATIONS:   1. Medical management        Angiographic Findings: 6/3/14  Left Main:  Normal.   Left Anterior Descending:  Proximal ~70% hazy lesion at proximal edge of prior LAD stent. Mild mid-distal luminal irregularities. The proximal stent itself is widely patent. Circumflex:  Normal.  Co-dominant LCx. Right Coronary:  Normal.   Left Ventriculogram:  LVEF 55-60%. NO regional wall motion abnormalities. Femoral Artery:  No obstructive plaque. Intervention:  Anticoagulation with Angiomax was used (see nursing notes for details). A 6 Fr EBU 3.5 guide catheter was then advanced over the wire to the ascending aorta. The wire was removed, and the left main coronary artery was engaged. A BMW wire was then advanced to the distal LAD. Proximal LAD  Stent: Direct stent with a 3.5 x 8 mm Xience CLARA taken to 12 MCKENNA  Post-Dilation:  3.5 x 6 mm taken to 16 MCKENNA     Angiograms were performed post-angioplasty/stent deployment. ROD 3 flow was present after the intervention. Hemodynamics (mm Hg):  Left Ventricular Pressure:  121/1, 7  Central Aortic Pressure:  118/62     Conclusions:  -Edge restenosis of the proximal LAD stent.  ~70% hazy stenosis stented to <10%.    -Non-obstructive CAD elsewhere  -Normal LVEF with EF 55-60%  -Normal LVEDP     Recommendations:  ASA and effient  BB, ACE, statin as tolerates        Labs:   Lab Results   Component Value Date    CREATININE 1.1 09/16/2020    BUN 13 09/16/2020     09/16/2020    K 4.6 09/16/2020     09/16/2020    CO2 27 09/16/2020      Lab Results   Component Value Date    WBC 6.4 09/16/2020    HGB 14.4 09/16/2020    HCT 42.7 09/16/2020    MCV 91.7 09/16/2020     09/16/2020      Lab Results   Component Value Date    INR 1.03 09/15/2020 PROTIME 12.0 09/15/2020    No results found for: BNP    Radiology: N/A    Treatments: analgesia: Fentanyl, Versed, cardiac meds: metoprolol and atorvastatin and anticoagulation: ASA and Plavix    Disposition: home    Patient Instructions:    Brigida Mares   Home Medication Instructions FAC:[id number]    Printed on:09/16/20 0915   Medication Information                      aspirin (ASPIRIN LOW DOSE) 81 MG EC tablet  TAKE 1 TABLET DAILY             atorvastatin (LIPITOR) 40 MG tablet  TAKE 1 TABLET DAILY             clopidogrel (PLAVIX) 75 MG tablet  TAKE 1 TABLET DAILY             metoprolol succinate (TOPROL XL) 25 MG extended release tablet  Take 0.5 tablets by mouth daily               Activity: no lifting or strenuous exercise for 1 week, no driving for today   Diet: cardiac diet  Wound Care: keep wound clean and dry    Follow-up with Dr. Sierra Grayson in 2 weeks.     Signed:  Jamia Irizarry, 9/16/2020, 9:15 AM

## 2020-09-30 ENCOUNTER — OFFICE VISIT (OUTPATIENT)
Dept: CARDIOLOGY CLINIC | Age: 55
End: 2020-09-30
Payer: COMMERCIAL

## 2020-09-30 ENCOUNTER — HOSPITAL ENCOUNTER (OUTPATIENT)
Dept: NON INVASIVE DIAGNOSTICS | Age: 55
Discharge: HOME | End: 2020-09-30
Payer: COMMERCIAL

## 2020-09-30 VITALS
BODY MASS INDEX: 24.11 KG/M2 | OXYGEN SATURATION: 98 % | DIASTOLIC BLOOD PRESSURE: 80 MMHG | HEIGHT: 72 IN | SYSTOLIC BLOOD PRESSURE: 108 MMHG | HEART RATE: 65 BPM | WEIGHT: 178 LBS

## 2020-09-30 LAB
LV EF: 53 %
LVEF MODALITY: NORMAL

## 2020-09-30 PROCEDURE — 1111F DSCHRG MED/CURRENT MED MERGE: CPT | Performed by: INTERNAL MEDICINE

## 2020-09-30 PROCEDURE — 99214 OFFICE O/P EST MOD 30 MIN: CPT | Performed by: INTERNAL MEDICINE

## 2020-09-30 PROCEDURE — 93306 TTE W/DOPPLER COMPLETE: CPT

## 2020-09-30 NOTE — PATIENT INSTRUCTIONS
Plan:    Continue current medications   Check blood pressure at home weekly  Regular exercise and following a healthy diet encouraged   Follow up with me in 1 year

## 2020-09-30 NOTE — PROGRESS NOTES
[address]   Cardiac Followup    Referring Provider:  MARCELO KURTZ [address] [facility]     Chief Complaint   Patient presents with    Follow-Up from Hospital    Coronary Artery Disease    Hypertension    Hyperlipidemia        History of Present Illness:   Fabián Boucher is a 54 y.o. male who is here today for follow up for a history of coronary artery disease- Cypher stent 1/1/10 LAD in [place] following admit for unstable angina. Recurrent angina 6/2014 lead to CLARA LAD prox to old stent at [facility] w/ Dr Tevin Cutler, chest pain, and hyperlipidemia. He presented to the ER in [place] with complaints of chest pain, Troponin's and EKG negative for ischemia. He underwent a left heart cath on 9/15/2020 (VSP) with PCI to Diag 1. Today he states he has been feeling well since his procedure. No further chest pain. He has some pain and swelling at his radial procedure site which started to decrease yesterday. He has started back exercising with no issues. He is tolerating his medications. Patient currently denies any weight gain, edema, palpitations, chest pain, shortness of breath, dizziness, and syncope. Past Medical History:   has a past medical history of CAD (coronary artery disease), Hyperlipidemia, Hypertension, and Peripheral vascular disease ([address] Surgical History:   has a past surgical history that includes Coronary angioplasty with stent (01/01/2010) and Coronary angioplasty with stent (06/03/2014). Social History:   reports that he has never smoked. He has never used smokeless tobacco. He reports current alcohol use. He reports that he does not use drugs. Family History:  family history includes Heart Disease in his paternal uncle, paternal uncle, and paternal uncle; Heart Disease (age of onset: 52) in his father. Home Medications:  Prior to Admission medications    Medication Sig Start Date End Date Taking? Authorizing Provider   aspirin 81 MG EC tablet Take 81 mg by mouth daily.      Yes Historical Provider, MD   prasugrel (EFFIENT) 5 MG TABS Take 5 mg by mouth daily. Yes Historical Provider, MD   metoprolol (LOPRESSOR) 25 MG tablet Take 1 tablet by mouth 2 times daily. 12/5/11 12/4/12 Yes Mami Duarte MD   atorvastatin (LIPITOR) 80 MG tablet Take 1/2 tablet daily 8/29/11  Yes Lea Dwyer MD        Allergies:  Patient has no known allergies. Review of Systems:   · Constitutional: there has been no unanticipated weight loss. There's been no change in energy level, sleep pattern, or activity level. · Eyes: No visual changes or diplopia. No scleral icterus. · ENT: No Headaches, hearing loss or vertigo. No mouth sores or sore throat. · Cardiovascular: Reviewed in HPI  · Respiratory: No cough or wheezing, no sputum production. No hematemesis. · Gastrointestinal: No abdominal pain, appetite loss, blood in stools. No change in bowel or bladder habits. · Genitourinary: No dysuria, trouble voiding, or hematuria. · Musculoskeletal:  No gait disturbance, weakness or joint complaints. · Integumentary: No rash or pruritis. · Neurological: No headache, diplopia, change in muscle strength, numbness or tingling. No change in gait, balance, coordination, mood, affect, memory, mentation, behavior. · Psychiatric: No anxiety, no depression. · Endocrine: No malaise, fatigue or temperature intolerance. No excessive thirst, fluid intake, or urination. No tremor. · Hematologic/Lymphatic: No abnormal bruising or bleeding, blood clots or swollen lymph nodes. · Allergic/Immunologic: No nasal congestion or hives.     Physical Examination:    Vitals:    09/30/20 1341   BP: 108/80   Pulse: 65   SpO2: 98%        Constitutional and General Appearance: NAD   Respiratory:  · Normal excursion and expansion without use of accessory muscles  · Resp Auscultation: Normal breath sounds without dullness  Cardiovascular:  · The apical impulses not displaced  · Heart tones are crisp and normal  · Cervical veins are not engorged  · The carotid upstroke is normal in amplitude and contour without delay or bruit  · Normal S1S2, No S3, No Murmur  · Peripheral pulses are symmetrical and full  · There is no clubbing, cyanosis of the extremities. · No edema  · Femoral Arteries: 2+ and equal  · Pedal Pulses: 2+ and equal   Abdomen:  · No masses or tenderness  · Liver/Spleen: No Abnormalities Noted  Neurological/Psychiatric:  · Alert and oriented in all spheres  · Moves all extremities well  · Exhibits normal gait balance and coordination  · No abnormalities of mood, affect, memory, mentation, or behavior are noted    Cath side mild bruising and tender. Good pulses     Echocardiogram 2010  EF 60%    Stress test 2010  Normal stress test     Left heart cath VSP 9/15/2020  Procedure Findings:  1.  70 to 80% stenosis of the origin of the first diagonal branch              ~Mild to moderate in-stent restenosis of the proximal LAD stent. 2. Normal left ventricular function with EF estimated at 55-60%  3. Normal left heart hemodynamics      Assessment: Jovana Hawkins is doing well from cardiac standpoint. He continues to exercise and also follows a Vegan diet . 1. CAD (coronary artery disease) Cypher stent 1/1/10 LAD in [place] following admit for [place]. Recurrent angina 6/2014 lead to CLARA LAD prox to old stent at [facility] w/ Dr Maged Mello. Cutting balloon Fabby Ugalde 9/2020. Geralene Chrissie Exercises regularly. Stable. No angina. 2. Hyperlipidemia - well controlled, results reviewed with patient    3. Angina - no recurrence  4. Vascular screen at work \"ok\"  Cath site bruising - improving     Plan:    Continue current medications   Check blood pressure at home weekly  Regular exercise and following a healthy diet encouraged   Follow up with me in 1 year       Scribe's attestation:   This note was scribed in the presence of Dr. Nito Pacheco M.D. By Iris Shankar RN    The scribes documentation has been prepared under my direction and personally reviewed by me in its entirety. I confirm that the note above accurately reflects all work, treatment, procedures, and medical decision making performed by me. MD Kim Adams.  José Manuel Stovall M.D., Nabila Bowman

## 2020-09-30 NOTE — LETTER
[address]   Cardiac Followup    Referring Provider:  MARCELO KRUTZ [facility]     Chief Complaint   Patient presents with    Follow-Up from Hospital    Coronary Artery Disease    Hypertension    Hyperlipidemia        History of Present Illness:   Adama Anderson is a 54 y.o. male who is here today for follow up for a history of coronary artery disease- Cypher stent 1/1/10 LAD in [place] following admit for unstable angina. Recurrent angina 6/2014 lead to CLARA LAD prox to old stent at [facility] w/ Dr Caresse Klinefelter, chest pain, and hyperlipidemia. He presented to the ER in [place] with complaints of chest pain, Troponin's and EKG negative for ischemia. He underwent a left heart cath on 9/15/2020 (VSP) with PCI to Diag 1. Today he states he has been feeling well since his procedure. No further chest pain. He has some pain and swelling at his radial procedure site which started to decrease yesterday. He has started back exercising with no issues. He is tolerating his medications. Patient currently denies any weight gain, edema, palpitations, chest pain, shortness of breath, dizziness, and syncope. Past Medical History:   has a past medical history of CAD (coronary artery disease), Hyperlipidemia, Hypertension, and Peripheral vascular disease ([address] Surgical History:   has a past surgical history that includes Coronary angioplasty with stent (01/01/2010) and Coronary angioplasty with stent (06/03/2014). Social History:   reports that he has never smoked. He has never used smokeless tobacco. He reports current alcohol use. He reports that he does not use drugs. Family History:  family history includes Heart Disease in his paternal uncle, paternal uncle, and paternal uncle; Heart Disease (age of onset: 52) in his father. Home Medications:  Prior to Admission medications    Medication Sig Start Date End Date Taking? Authorizing Provider   aspirin 81 MG EC tablet Take 81 mg by mouth daily.      Yes Historical Provider, MD   prasugrel (EFFIENT) 5 MG TABS Take 5 mg by mouth daily. Yes Historical Provider, MD   metoprolol (LOPRESSOR) 25 MG tablet Take 1 tablet by mouth 2 times daily. 12/5/11 12/4/12 Yes Kenrick Mcgee MD   atorvastatin (LIPITOR) 80 MG tablet Take 1/2 tablet daily 8/29/11  Yes Alfonzo Sandoval MD        Allergies:  Patient has no known allergies. Review of Systems:   · Constitutional: there has been no unanticipated weight loss. There's been no change in energy level, sleep pattern, or activity level. · Eyes: No visual changes or diplopia. No scleral icterus. · ENT: No Headaches, hearing loss or vertigo. No mouth sores or sore throat. · Cardiovascular: Reviewed in HPI  · Respiratory: No cough or wheezing, no sputum production. No hematemesis. · Gastrointestinal: No abdominal pain, appetite loss, blood in stools. No change in bowel or bladder habits. · Genitourinary: No dysuria, trouble voiding, or hematuria. · Musculoskeletal:  No gait disturbance, weakness or joint complaints. · Integumentary: No rash or pruritis. · Neurological: No headache, diplopia, change in muscle strength, numbness or tingling. No change in gait, balance, coordination, mood, affect, memory, mentation, behavior. · Psychiatric: No anxiety, no depression. · Endocrine: No malaise, fatigue or temperature intolerance. No excessive thirst, fluid intake, or urination. No tremor. · Hematologic/Lymphatic: No abnormal bruising or bleeding, blood clots or swollen lymph nodes. · Allergic/Immunologic: No nasal congestion or hives.     Physical Examination:    Vitals:    09/30/20 1341   BP: 108/80   Pulse: 65   SpO2: 98%        Constitutional and General Appearance: NAD   Respiratory:  · Normal excursion and expansion without use of accessory muscles  · Resp Auscultation: Normal breath sounds without dullness  Cardiovascular:  · The apical impulses not displaced  · Heart tones are crisp and normal · Cervical veins are not engorged  · The carotid upstroke is normal in amplitude and contour without delay or bruit  · Normal S1S2, No S3, No Murmur  · Peripheral pulses are symmetrical and full  · There is no clubbing, cyanosis of the extremities. · No edema  · Femoral Arteries: 2+ and equal  · Pedal Pulses: 2+ and equal   Abdomen:  · No masses or tenderness  · Liver/Spleen: No Abnormalities Noted  Neurological/Psychiatric:  · Alert and oriented in all spheres  · Moves all extremities well  · Exhibits normal gait balance and coordination  · No abnormalities of mood, affect, memory, mentation, or behavior are noted    Cath side mild bruising and tender. Good pulses     Echocardiogram 2010  EF 60%    Stress test 2010  Normal stress test     Left heart cath VSP 9/15/2020  Procedure Findings:  1.  70 to 80% stenosis of the origin of the first diagonal branch              ~Mild to moderate in-stent restenosis of the proximal LAD stent. 2. Normal left ventricular function with EF estimated at 55-60%  3. Normal left heart hemodynamics      Assessment: Sebastián Minaya is doing well from cardiac standpoint. He continues to exercise and also follows a Vegan diet . 1. CAD (coronary artery disease) Cypher stent 1/1/10 LAD in [place] following admit for [place]. Recurrent angina 6/2014 lead to CLARA LAD prox to old stent at [facility] w/ Dr Jeff Armas. Cutting balloon Diag [place] 9/2020. [employer] Exercises regularly. Stable. No angina. 2. Hyperlipidemia - well controlled, results reviewed with patient    3. Angina - no recurrence  4. Vascular screen at work \"ok\"  Cath site bruising - improving     Plan:    Continue current medications   Check blood pressure at home weekly  Regular exercise and following a healthy diet encouraged   Follow up with me in 1 year       Scribe's attestation:   This note was scribed in the presence of Dr. Yolette Mathews M.D. By Nicolas Garcia RN    The scribes documentation has been prepared under my direction and personally reviewed by me in its entirety. I confirm that the note above accurately reflects all work, treatment, procedures, and medical decision making performed by me. MD Tanja Ghosh.  Shola Tamez M.D., Laurie Cason

## 2021-03-25 ENCOUNTER — IMMUNIZATION (OUTPATIENT)
Dept: PRIMARY CARE CLINIC | Age: 56
End: 2021-03-25
Payer: COMMERCIAL

## 2021-03-25 PROCEDURE — 91301 COVID-19, MODERNA VACCINE 100MCG/0.5ML DOSE: CPT | Performed by: FAMILY MEDICINE

## 2021-03-25 PROCEDURE — 0011A PR IMM ADMN SARSCOV2 100 MCG/0.5 ML 1ST DOSE: CPT | Performed by: FAMILY MEDICINE

## 2021-06-30 NOTE — TELEPHONE ENCOUNTER
Ov 2/25/19 [facility]  Lipid 8/8/17  Needs Lipid  Plavix & Metoprolol & ASA filled 6/24/19 -needs diff pharm  Lipitor has enough till January. This visit is being performed via video for  IOP. Clinician Location: Home; Patient Location: Home    Pt is in [place] and pt's identity has been established. 25 minutes were spent in this encounter.     Writer met with pt for initial intake into IOP programming.  Writer introduced herself to pt and reviewed IOP expectations. Pt provided digital and verbal consent for treatment.     Pt also provided verbal consent for outreach to emergency contact (Jaelyn, mother) in order to connect with pt if pt unable to be reached directly and to seek additional support in cases of a crisis/change in level of need. Emergency contact phone number is [phone].     Writer administered PHQ-9, NORMAN-7, CSSR-S Brief assessments and developed Safety Plan with pt.  Writer and pt also created initial treatment plan.  Pt endorsed worsening symptoms of auditory and visual hallucinations that Pt thinks was triggered by past trauma.     Pt was attentive and engaged with writer. Pt denied any acute or current SI but did endorse past safety concerns. Pt was able to safety plan accordingly.     Plan:  Pt will attend IOP programming as scheduled and writer will follow-up with pt in 2 weeks to check-in accordingly (7/14/21).        JANUSZ Maldonado

## 2021-08-19 RX ORDER — CLOPIDOGREL BISULFATE 75 MG/1
75 TABLET ORAL DAILY
Qty: 90 TABLET | Refills: 3 | Status: SHIPPED | OUTPATIENT
Start: 2021-08-19 | End: 2021-08-19

## 2021-08-19 RX ORDER — CLOPIDOGREL BISULFATE 75 MG/1
TABLET ORAL
Qty: 90 TABLET | Refills: 1 | Status: SHIPPED | OUTPATIENT
Start: 2021-08-19 | End: 2022-03-17 | Stop reason: SDUPTHER

## 2021-08-19 NOTE — TELEPHONE ENCOUNTER
[place] [facility] [facility] [place]

## 2021-09-13 RX ORDER — METOPROLOL SUCCINATE 25 MG/1
12.5 TABLET, EXTENDED RELEASE ORAL DAILY
Qty: 90 TABLET | Refills: 3 | Status: SHIPPED | OUTPATIENT
Start: 2021-09-13 | End: 2022-03-17 | Stop reason: SDUPTHER

## 2021-09-13 RX ORDER — ASPIRIN 81 MG/1
TABLET ORAL
Qty: 90 TABLET | Refills: 3 | Status: SHIPPED | OUTPATIENT
Start: 2021-09-13

## 2021-09-13 NOTE — TELEPHONE ENCOUNTER
Pt is needing a yearly supply on his Metoprolol and Aspirin please. Please send to mail order pharmacy [facility] pharmacy.

## 2021-10-25 ENCOUNTER — OFFICE VISIT (OUTPATIENT)
Dept: CARDIOLOGY CLINIC | Age: 56
End: 2021-10-25
Payer: COMMERCIAL

## 2021-10-25 VITALS
HEART RATE: 50 BPM | HEIGHT: 72 IN | BODY MASS INDEX: 24.11 KG/M2 | DIASTOLIC BLOOD PRESSURE: 82 MMHG | SYSTOLIC BLOOD PRESSURE: 132 MMHG | OXYGEN SATURATION: 99 % | WEIGHT: 178 LBS

## 2021-10-25 DIAGNOSIS — I25.10 CORONARY ARTERY DISEASE INVOLVING NATIVE CORONARY ARTERY OF NATIVE HEART WITHOUT ANGINA PECTORIS: Primary | ICD-10-CM

## 2021-10-25 PROCEDURE — 99214 OFFICE O/P EST MOD 30 MIN: CPT | Performed by: INTERNAL MEDICINE

## 2021-10-25 NOTE — PROGRESS NOTES
Medications:  Prior to Admission medications    Medication Sig Start Date End Date Taking? Authorizing Provider   aspirin 81 MG EC tablet Take 81 mg by mouth daily. Yes Historical Provider, MD   prasugrel (EFFIENT) 5 MG TABS Take 5 mg by mouth daily. Yes Historical Provider, MD   metoprolol (LOPRESSOR) 25 MG tablet Take 1 tablet by mouth 2 times daily. 12/5/11 12/4/12 Yes Elina Peacock MD   atorvastatin (LIPITOR) 80 MG tablet Take 1/2 tablet daily 8/29/11  Yes Author MD Magi        Allergies:  Patient has no known allergies. Review of Systems:   · Constitutional: there has been no unanticipated weight loss. There's been no change in energy level, sleep pattern, or activity level. · Eyes: No visual changes or diplopia. No scleral icterus. · ENT: No Headaches, hearing loss or vertigo. No mouth sores or sore throat. · Cardiovascular: Reviewed in HPI  · Respiratory: No cough or wheezing, no sputum production. No hematemesis. · Gastrointestinal: No abdominal pain, appetite loss, blood in stools. No change in bowel or bladder habits. · Genitourinary: No dysuria, trouble voiding, or hematuria. · Musculoskeletal:  No gait disturbance, weakness or joint complaints. · Integumentary: No rash or pruritis. · Neurological: No headache, diplopia, change in muscle strength, numbness or tingling. No change in gait, balance, coordination, mood, affect, memory, mentation, behavior. · Psychiatric: No anxiety, no depression. · Endocrine: No malaise, fatigue or temperature intolerance. No excessive thirst, fluid intake, or urination. No tremor. · Hematologic/Lymphatic: No abnormal bruising or bleeding, blood clots or swollen lymph nodes. · Allergic/Immunologic: No nasal congestion or hives.     Physical Examination:    Vitals:    10/25/21 1531   BP: 132/82   Pulse: 50   SpO2: 99%        Constitutional and General Appearance: NAD   Respiratory:  · Normal excursion and expansion without use of accessory muscles  · Resp Auscultation: Normal breath sounds without dullness  Cardiovascular:  · The apical impulses not displaced  · Heart tones are crisp and normal  · Cervical veins are not engorged  · The carotid upstroke is normal in amplitude and contour without delay or bruit  · Normal S1S2, No S3, No Murmur  · Peripheral pulses are symmetrical and full  · There is no clubbing, cyanosis of the extremities. · No edema  · Femoral Arteries: 2+ and equal  · Pedal Pulses: 2+ and equal   Abdomen:  · No masses or tenderness  · Liver/Spleen: No Abnormalities Noted  Neurological/Psychiatric:  · Alert and oriented in all spheres  · Moves all extremities well  · Exhibits normal gait balance and coordination  · No abnormalities of mood, affect, memory, mentation, or behavior are noted    Cath side mild bruising and tender. Good pulses     Echocardiogram 2010  EF 60%    Stress test 2010  Normal stress test     Left heart cath [facility] 9/15/2020  Procedure Findings:  1.  70 to 80% stenosis of the origin of the first diagonal branch              ~Mild to moderate in-stent restenosis of the proximal LAD stent. 2. Normal left ventricular function with EF estimated at 55-60%  3. Normal left heart hemodynamics      EKG 9/16/2020  Sinus bradycardia with sinus arrhythmia      Echocardiogram 9/30/2020  Conclusions      Summary   Left ventricular systolic function is low normal with a visually estimated   ejection fraction of 50-55%. EF calculated by Matthews's method at 54%. The left ventricle is normal in size with normal wall thickness. No regional wall motion abnormalities are noted. Normal left ventricular diastolic function. Mild mitral regurgitation. Assessment: [address] is doing well from cardiac standpoint. He continues to exercise and also follows a Vegan diet . 1. CAD (coronary artery disease) Cypher stent 1/1/10 LAD in [place] following admit for [place].  Recurrent angina 6/2014 lead to CLARA LAD prox to old stent at [facility] w/ Dr Demi Myers. Seen in ED in [place] 9/2020 w/ angina. Returned to [place] next day and had balloon Diag [place] 9/2020. Exercises regularly. Stable. No angina. 2. Hyperlipidemia - well controlled, results reviewed with patient    3. Angina - no recurrence  4. Vascular screen at work \"ok\"    Plan:    Cardiac medications reviewed including indications and pertinent side effects. Refills provided as needed. Check blood pressure and heart rate at home a few times per week- keep a log with dates and times and bring to office visit   Regular exercise and following a healthy diet encouraged   Follow up with me in 1 year     Scribe's attestation: This note was scribed in the presence of Dr. Adelaide Garces M.D. By Melvyn Harada RN    The scribes documentation has been prepared under my direction and personally reviewed by me in its entirety. I confirm that the note above accurately reflects all work, treatment, procedures, and medical decision making performed by me. MD Peterson Ring.  Cecile Urban M.D., [place] Speaker

## 2021-10-25 NOTE — PATIENT INSTRUCTIONS
Plan:    Cardiac medications reviewed including indications and pertinent side effects    Check blood pressure and heart rate at home a few times per week- keep a log with dates and times and bring to office visit   Regular exercise and following a healthy diet encouraged   Follow up with me in 1 year

## 2021-10-25 NOTE — LETTER
Bruno Fournier  1965  [address]   Cardiac Followup    Referring Provider:  MARCELO KURTZ [address]     Chief Complaint   Patient presents with    1 Year Follow Up    Coronary Artery Disease    Hyperlipidemia    Hypertension      García Chavez   1965    History of Present Illness:   García Chavez is a 64 y.o. male who is here today for follow up for a history of coronary artery disease- Cypher stent 1/1/10 LAD in [place] following admit for unstable angina. Recurrent angina 6/2014 lead to CLARA LAD prox to old stent at [facility] w/ Dr Kelly Wilkinson, chest pain, and hyperlipidemia. He presented to the ER in [place] with complaints of chest pain, Troponin's and EKG negative for ischemia. He underwent a left heart cath on 9/15/2020 ([facility]) with PCI to Diag 1. Today he states he has been feeling well since his last visit. He recently bought a mountain bike and is staying active. He also does yoga and also does cross fit training. He has not noted any decrease in his exercise tolerance. He continues to eat a vegan diet. He is tolerating his medications and taking them as prescribed. He states he has not missed any doses of medications over the last 10 years. He continues to work full time. Patient currently denies any weight gain, edema, palpitations, chest pain, shortness of breath, dizziness, and syncope. Past Medical History:   has a past medical history of CAD (coronary artery disease), Hyperlipidemia, Hypertension, and Peripheral vascular disease ([address] Surgical History:   has a past surgical history that includes Coronary angioplasty with stent (01/01/2010) and Coronary angioplasty with stent (06/03/2014). Social History:   reports that he has never smoked. He has never used smokeless tobacco. He reports current alcohol use. He reports that he does not use drugs. Family History:  family history includes Heart Disease in his paternal uncle, paternal uncle, and paternal uncle;  Heart Disease (age of onset: 52) in his father. Home Medications:  Prior to Admission medications    Medication Sig Start Date End Date Taking? Authorizing Provider   aspirin 81 MG EC tablet Take 81 mg by mouth daily. Yes Historical Provider, MD   prasugrel (EFFIENT) 5 MG TABS Take 5 mg by mouth daily. Yes Historical Provider, MD   metoprolol (LOPRESSOR) 25 MG tablet Take 1 tablet by mouth 2 times daily. 12/5/11 12/4/12 Yes Kylie Hutchins MD   atorvastatin (LIPITOR) 80 MG tablet Take 1/2 tablet daily 8/29/11  Yes Rolanda Wharton MD        Allergies:  Patient has no known allergies. Review of Systems:   · Constitutional: there has been no unanticipated weight loss. There's been no change in energy level, sleep pattern, or activity level. · Eyes: No visual changes or diplopia. No scleral icterus. · ENT: No Headaches, hearing loss or vertigo. No mouth sores or sore throat. · Cardiovascular: Reviewed in HPI  · Respiratory: No cough or wheezing, no sputum production. No hematemesis. · Gastrointestinal: No abdominal pain, appetite loss, blood in stools. No change in bowel or bladder habits. · Genitourinary: No dysuria, trouble voiding, or hematuria. · Musculoskeletal:  No gait disturbance, weakness or joint complaints. · Integumentary: No rash or pruritis. · Neurological: No headache, diplopia, change in muscle strength, numbness or tingling. No change in gait, balance, coordination, mood, affect, memory, mentation, behavior. · Psychiatric: No anxiety, no depression. · Endocrine: No malaise, fatigue or temperature intolerance. No excessive thirst, fluid intake, or urination. No tremor. · Hematologic/Lymphatic: No abnormal bruising or bleeding, blood clots or swollen lymph nodes. · Allergic/Immunologic: No nasal congestion or hives.     Physical Examination:    Vitals:    10/25/21 1531   BP: 132/82   Pulse: 50   SpO2: 99%        Constitutional and General Appearance: NAD   Respiratory:  · Normal excursion and expansion without use of accessory muscles  · Resp Auscultation: Normal breath sounds without dullness  Cardiovascular:  · The apical impulses not displaced  · Heart tones are crisp and normal  · Cervical veins are not engorged  · The carotid upstroke is normal in amplitude and contour without delay or bruit  · Normal S1S2, No S3, No Murmur  · Peripheral pulses are symmetrical and full  · There is no clubbing, cyanosis of the extremities. · No edema  · Femoral Arteries: 2+ and equal  · Pedal Pulses: 2+ and equal   Abdomen:  · No masses or tenderness  · Liver/Spleen: No Abnormalities Noted  Neurological/Psychiatric:  · Alert and oriented in all spheres  · Moves all extremities well  · Exhibits normal gait balance and coordination  · No abnormalities of mood, affect, memory, mentation, or behavior are noted    Cath side mild bruising and tender. Good pulses     Echocardiogram 2010  EF 60%    Stress test 2010  Normal stress test     Left heart cath VSP 9/15/2020  Procedure Findings:  1.  70 to 80% stenosis of the origin of the first diagonal branch              ~Mild to moderate in-stent restenosis of the proximal LAD stent. 2. Normal left ventricular function with EF estimated at 55-60%  3. Normal left heart hemodynamics      EKG 9/16/2020  Sinus bradycardia with sinus arrhythmia      Echocardiogram 9/30/2020  Conclusions      Summary   Left ventricular systolic function is low normal with a visually estimated   ejection fraction of 50-55%. EF calculated by Matthews's method at 54%. The left ventricle is normal in size with normal wall thickness. No regional wall motion abnormalities are noted. Normal left ventricular diastolic function. Mild mitral regurgitation. Assessment: Freddy Mirza is doing well from cardiac standpoint. He continues to exercise and also follows a Vegan diet . 1. CAD (coronary artery disease) Cypher stent 1/1/10 LAD in [place] following admit for [place].  Recurrent angina 6/2014 lead to CLARA LAD prox to old stent at [facility] w/ Dr Priscila Anne. Seen in ED in [place] 9/2020 w/ angina. Returned to [place] next day and had balloon Diag [place] 9/2020. Exercises regularly. Stable. No angina. 2. Hyperlipidemia - well controlled, results reviewed with patient    3. Angina - no recurrence  4. Vascular screen at work \"ok\"    Plan:    Cardiac medications reviewed including indications and pertinent side effects. Refills provided as needed. Check blood pressure and heart rate at home a few times per week- keep a log with dates and times and bring to office visit   Regular exercise and following a healthy diet encouraged   Follow up with me in 1 year     Scribe's attestation: This note was scribed in the presence of Dr. Magalis Rebolledo M.D. By Allyssa Ramos RN    The scribes documentation has been prepared under my direction and personally reviewed by me in its entirety. I confirm that the note above accurately reflects all work, treatment, procedures, and medical decision making performed by me. MD Ciarra Dent.  Burnis Osler, M.D., Jyothi Mahajan

## 2022-03-17 RX ORDER — CLOPIDOGREL BISULFATE 75 MG/1
TABLET ORAL
Qty: 90 TABLET | Refills: 3 | Status: SHIPPED | OUTPATIENT
Start: 2022-03-17

## 2022-03-17 RX ORDER — METOPROLOL SUCCINATE 25 MG/1
12.5 TABLET, EXTENDED RELEASE ORAL DAILY
Qty: 90 TABLET | Refills: 3 | Status: SHIPPED | OUTPATIENT
Start: 2022-03-17 | End: 2022-09-26

## 2022-03-17 RX ORDER — ATORVASTATIN CALCIUM 40 MG/1
40 TABLET, FILM COATED ORAL DAILY
Qty: 90 TABLET | Refills: 3 | Status: SHIPPED | OUTPATIENT
Start: 2022-03-17

## 2022-09-26 ENCOUNTER — TELEPHONE (OUTPATIENT)
Dept: CARDIOLOGY CLINIC | Age: 57
End: 2022-09-26

## 2022-09-26 RX ORDER — METOPROLOL SUCCINATE 25 MG/1
TABLET, EXTENDED RELEASE ORAL
Qty: 90 TABLET | Refills: 3 | Status: SHIPPED | OUTPATIENT
Start: 2022-09-26

## 2022-10-12 NOTE — TELEPHONE ENCOUNTER
2/25/19 [facility]]  Plan:    Lipids per PCP   Check blood pressure intermittently at home  Continue exercise regimen and follow a healthy diet   Continue current medications   Follow up in 12 months Patient/Caregiver provided printed discharge information.

## 2023-01-09 ENCOUNTER — OFFICE VISIT (OUTPATIENT)
Dept: CARDIOLOGY CLINIC | Age: 58
End: 2023-01-09
Payer: COMMERCIAL

## 2023-01-09 VITALS
DIASTOLIC BLOOD PRESSURE: 80 MMHG | OXYGEN SATURATION: 99 % | HEIGHT: 72 IN | WEIGHT: 181 LBS | SYSTOLIC BLOOD PRESSURE: 126 MMHG | HEART RATE: 54 BPM | BODY MASS INDEX: 24.52 KG/M2

## 2023-01-09 DIAGNOSIS — I10 ESSENTIAL HYPERTENSION, BENIGN: ICD-10-CM

## 2023-01-09 DIAGNOSIS — I25.110 CORONARY ARTERY DISEASE INVOLVING NATIVE CORONARY ARTERY OF NATIVE HEART WITH UNSTABLE ANGINA PECTORIS (HCC): Primary | ICD-10-CM

## 2023-01-09 DIAGNOSIS — E78.2 MIXED HYPERLIPIDEMIA: ICD-10-CM

## 2023-01-09 PROCEDURE — 99214 OFFICE O/P EST MOD 30 MIN: CPT | Performed by: INTERNAL MEDICINE

## 2023-01-09 PROCEDURE — 3079F DIAST BP 80-89 MM HG: CPT | Performed by: INTERNAL MEDICINE

## 2023-01-09 PROCEDURE — 3074F SYST BP LT 130 MM HG: CPT | Performed by: INTERNAL MEDICINE

## 2023-01-09 RX ORDER — ATORVASTATIN CALCIUM 40 MG/1
40 TABLET, FILM COATED ORAL DAILY
Qty: 90 TABLET | Refills: 3 | Status: SHIPPED | OUTPATIENT
Start: 2023-01-09

## 2023-01-09 RX ORDER — CLOPIDOGREL BISULFATE 75 MG/1
TABLET ORAL
Qty: 90 TABLET | Refills: 3 | Status: SHIPPED | OUTPATIENT
Start: 2023-01-09 | End: 2023-01-10 | Stop reason: SDUPTHER

## 2023-01-09 RX ORDER — METOPROLOL SUCCINATE 25 MG/1
TABLET, EXTENDED RELEASE ORAL
Qty: 90 TABLET | Refills: 3 | Status: SHIPPED | OUTPATIENT
Start: 2023-01-09

## 2023-01-09 NOTE — PATIENT INSTRUCTIONS
Plan:    Cardiac medications reviewed including indications and pertinent side effects. Medication list updated at this visit.    Check blood pressure and heart rate at home a few times per week- keep a log with dates and times and bring to office visit   Regular exercise and following a healthy diet encouraged   Follow up with me   Medications refilled for one year

## 2023-01-09 NOTE — TELEPHONE ENCOUNTER
Pt is renewing his medications and he needs a 90 day supply of Plavix 75mg.  New pharmacy is [facility]  [phone]

## 2023-01-09 NOTE — PROGRESS NOTES
[address]   Cardiac Followup    Referring Provider:  [place]     Chief Complaint   Patient presents with    Follow-up    Coronary Artery Disease    Hypertension    Hyperlipidemia        Libby Fournier   1965    History of Present Illness:   Suzi Chowdhury is a 62 y.o. male who is here today for follow up for a history of coronary artery disease- Cypher stent 1/1/10 LAD in [place] following admit for unstable angina. Recurrent angina 6/2014 lead to CLARA LAD prox to old stent at [facility] w/ Dr Haroon Felipe, chest pain, and hyperlipidemia. He presented to the ER in [place] with complaints of chest pain, Troponin's and EKG negative for ischemia. He underwent a left heart cath on 9/15/2020 ([facility]) with PCI to Diag 1. Today he states he has been feeling well sine his last visit. He is tolerating his medications and taking them as prescribed. He plans to have a yearly appointment with his PCP soon. He stays active working out several days per week. Patient currently denies any weight gain, edema, palpitations, chest pain, shortness of breath, dizziness, and syncope. Past Medical History:   has a past medical history of CAD (coronary artery disease), Hyperlipidemia, Hypertension, and Peripheral vascular disease ([address] Surgical History:   has a past surgical history that includes Coronary angioplasty with stent (01/01/2010) and Coronary angioplasty with stent (06/03/2014). Social History:   reports that he has never smoked. He has never used smokeless tobacco. He reports current alcohol use. He reports that he does not use drugs. Family History:  family history includes Heart Disease in his paternal uncle, paternal uncle, and paternal uncle; Heart Disease (age of onset: 52) in his father. Home Medications:  Prior to Admission medications    Medication Sig Start Date End Date Taking? Authorizing Provider   aspirin 81 MG EC tablet Take 81 mg by mouth daily.      Yes Historical Provider, MD prasugrel (EFFIENT) 5 MG TABS Take 5 mg by mouth daily. Yes Historical Provider, MD   metoprolol (LOPRESSOR) 25 MG tablet Take 1 tablet by mouth 2 times daily. 12/5/11 12/4/12 Yes Eleazar Cisneros MD   atorvastatin (LIPITOR) 80 MG tablet Take 1/2 tablet daily 8/29/11  Yes Suzette Villatoro MD        Allergies:  Patient has no known allergies. Review of Systems:   Constitutional: there has been no unanticipated weight loss. There's been no change in energy level, sleep pattern, or activity level. Eyes: No visual changes or diplopia. No scleral icterus. ENT: No Headaches, hearing loss or vertigo. No mouth sores or sore throat. Cardiovascular: Reviewed in HPI  Respiratory: No cough or wheezing, no sputum production. No hematemesis. Gastrointestinal: No abdominal pain, appetite loss, blood in stools. No change in bowel or bladder habits. Genitourinary: No dysuria, trouble voiding, or hematuria. Musculoskeletal:  No gait disturbance, weakness or joint complaints. Integumentary: No rash or pruritis. Neurological: No headache, diplopia, change in muscle strength, numbness or tingling. No change in gait, balance, coordination, mood, affect, memory, mentation, behavior. Psychiatric: No anxiety, no depression. Endocrine: No malaise, fatigue or temperature intolerance. No excessive thirst, fluid intake, or urination. No tremor. Hematologic/Lymphatic: No abnormal bruising or bleeding, blood clots or swollen lymph nodes. Allergic/Immunologic: No nasal congestion or hives. Physical Examination:    Vitals:    01/09/23 1416   BP: 126/80   Pulse: 54   SpO2: 99%          Constitutional and General Appearance: NAD   Respiratory:  Normal excursion and expansion without use of accessory muscles  Resp Auscultation: Normal breath sounds without dullness  Cardiovascular:   The apical impulses not displaced  Heart tones are crisp and normal  Cervical veins are not engorged  The carotid upstroke is normal in amplitude and contour without delay or bruit  Normal S1S2, No S3, No Murmur  Peripheral pulses are symmetrical and full  There is no clubbing, cyanosis of the extremities. No edema  Femoral Arteries: 2+ and equal  Pedal Pulses: 2+ and equal   Abdomen:  No masses or tenderness  Liver/Spleen: No Abnormalities Noted  Neurological/Psychiatric:  Alert and oriented in all spheres  Moves all extremities well  Exhibits normal gait balance and coordination  No abnormalities of mood, affect, memory, mentation, or behavior are noted    Cath side mild bruising and tender. Good pulses     Echocardiogram 2010  EF 60%    Stress test 2010  Normal stress test     Left heart cath VSP 9/15/2020  Procedure Findings:  1.  70 to 80% stenosis of the origin of the first diagonal branch              ~Mild to moderate in-stent restenosis of the proximal LAD stent. 2. Normal left ventricular function with EF estimated at 55-60%  3. Normal left heart hemodynamics      EKG 9/16/2020  Sinus bradycardia with sinus arrhythmia      Echocardiogram 9/30/2020  Conclusions      Summary   Left ventricular systolic function is low normal with a visually estimated   ejection fraction of 50-55%. EF calculated by Matthews's method at 54%. The left ventricle is normal in size with normal wall thickness. No regional wall motion abnormalities are noted. Normal left ventricular diastolic function. Mild mitral regurgitation. Latest Reference Range & Units 9/15/20 11:20   CHOLESTEROL, TOTAL, 552302 0 - 199 mg/dL 125   HDL Cholesterol 40 - 60 mg/dL 71 (H)   LDL Calculated <100 mg/dL 41   Triglycerides 0 - 150 mg/dL 64   VLDL Cholesterol Calculated Not Established mg/dL 13     Fasting lipids 9/2021  , HDL 62, LDL 55, TG 49      Assessment: Kristian Ventura is doing well from cardiac standpoint. He continues to exercise and also follows a Vegan diet . 1. CAD (coronary artery disease) Cypher stent 1/1/10 LAD in [place] following admit for [place].  Recurrent angina 6/2014 lead to CLARA LAD prox to old stent at [facility] w/ Dr Jaki Licea. Seen in ED in [place] 9/2020 w/ angina. Returned to [place] next day and had balloon Diag [place] 9/2020. Exercises regularly. Stable. No angina. 2. Hyperlipidemia - well controlled, results reviewed with patient    3. Angina - no recurrence  4. Vascular screen at work \"ok\"    Plan:    Cardiac medications reviewed including indications and pertinent side effects. Medication list updated at this visit. Check blood pressure and heart rate at home a few times per week- keep a log with dates and times and bring to office visit   Regular exercise and following a healthy diet encouraged   Follow up with me   Medications refilled. Continue DAPT, statin, BBlk. Scribe's attestation: This note was scribed in the presence of Dr. Carmen Brody M.D. By Abdoul Lynch RN    The scribes documentation has been prepared under my direction and personally reviewed by me in its entirety. I confirm that the note above accurately reflects all work, treatment, procedures, and medical decision making performed by me. MD Haley Narayanan.  Salazar Melendrez M.D., Scott Rajput

## 2023-01-10 RX ORDER — CLOPIDOGREL BISULFATE 75 MG/1
TABLET ORAL
Qty: 90 TABLET | Refills: 3 | Status: SHIPPED | OUTPATIENT
Start: 2023-01-10

## 2024-01-24 NOTE — TELEPHONE ENCOUNTER
Pt called requesting medications be filled and sent to     [id number] - [place] [id number] - [place] [phone] - F [phone]

## 2024-01-25 NOTE — TELEPHONE ENCOUNTER
01/25 attempted to contact pt [phone] (Home Phone)  To schedule [facility] LVM.

## 2024-01-25 NOTE — TELEPHONE ENCOUNTER
1/9/2023 [facility]  No upcoming appt  Care everywhere cbc, cmp lipid 9/11/2021    Please contact pt for yearly appt/med refill appt.

## 2024-01-26 RX ORDER — CLOPIDOGREL BISULFATE 75 MG/1
TABLET ORAL
Qty: 90 TABLET | Refills: 1 | Status: SHIPPED | OUTPATIENT
Start: 2024-01-26

## 2024-01-29 RX ORDER — ATORVASTATIN CALCIUM 40 MG/1
40 TABLET, FILM COATED ORAL DAILY
Qty: 90 TABLET | Refills: 0 | Status: SHIPPED | OUTPATIENT
Start: 2024-01-29

## 2024-01-29 RX ORDER — METOPROLOL SUCCINATE 25 MG/1
TABLET, EXTENDED RELEASE ORAL
Qty: 45 TABLET | Refills: 0 | Status: SHIPPED | OUTPATIENT
Start: 2024-01-29

## 2024-01-29 RX ORDER — ASPIRIN 81 MG/1
TABLET ORAL
Qty: 90 TABLET | Refills: 0 | Status: SHIPPED | OUTPATIENT
Start: 2024-01-29

## 2024-01-29 NOTE — TELEPHONE ENCOUNTER
1/9/2023 [facility]  2/26/2024 upcoming [facility] appt  9/15/2020 ekg  Care everywhere 9/11/2021 cbc, cmp, lipid, tsh

## 2024-01-29 NOTE — TELEPHONE ENCOUNTER
Medication Refill    Medication needing refilled:  Atorvastatin  Dosage of the medication:  40mg  How are you taking this medication (QD, BID, TID, QID, PRN):  QD  30 or 90 day supply called in:  90  Which Pharmacy are we sending the medication to?:  [facility] #[id number] [place], [place] - [address] - P [phone] - F [phone]     Medication Refill    Medication needing refilled:  Metoprolol succinate  Dosage of the medication:  25mg  How are you taking this medication (QD, BID, TID, QID, PRN):  QD  30 or 90 day supply called in:  90  Which Pharmacy are we sending the medication to?:  [id number] [place], [place] - [address] - P [phone] - F [phone]     Medication Refill    Medication needing refilled:  Aspirin  Dosage of the medication:  81mg  How are you taking this medication (QD, BID, TID, QID, PRN):  QD  30 or 90 day supply called in:  90  Which Pharmacy are we sending the medication to?:  [id number] [place], [place] - [address] - P [phone] -  [phone]

## 2024-02-23 NOTE — PROGRESS NOTES
Respiratory:  Normal excursion and expansion without use of accessory muscles  Resp Auscultation: Normal breath sounds without dullness  Cardiovascular:  The apical impulses not displaced  Heart tones are crisp and normal  Cervical veins are not engorged  The carotid upstroke is normal in amplitude and contour without delay or bruit  Normal S1S2, No S3, No Murmur  Peripheral pulses are symmetrical and full  There is no clubbing, cyanosis of the extremities.  No edema  Femoral Arteries: 2+ and equal  Pedal Pulses: 2+ and equal   Abdomen:  No masses or tenderness  Liver/Spleen: No Abnormalities Noted  Neurological/Psychiatric:  Alert and oriented in all spheres  Moves all extremities well  Exhibits normal gait balance and coordination  No abnormalities of mood, affect, memory, mentation, or behavior are noted    Cath side mild bruising and tender. Good pulses     Echocardiogram 2010  EF 60%    Stress test 2010  Normal stress test     Left heart cath 2014- Dr. Mckinney   Conclusions:  -Edge restenosis of the proximal LAD stent.  ~70% hazy stenosis stented to <10%.   -Non-obstructive CAD elsewhere  -Normal LVEF with EF 55-60%  -Normal LVEDP    Left heart cath [facility] 9/15/2020  Procedure Findings:  1.  70 to 80% stenosis of the origin of the first diagonal branch              ~Mild to moderate in-stent restenosis of the proximal LAD stent.  2. Normal left ventricular function with EF estimated at 55-60%  3. Normal left heart hemodynamics      EKG 9/16/2020  Sinus bradycardia with sinus arrhythmia      Echocardiogram 9/30/2020  Conclusions      Summary   Left ventricular systolic function is low normal with a visually estimated   ejection fraction of 50-55%.   EF calculated by Matthews's method at 54%.   The left ventricle is normal in size with normal wall thickness.   No regional wall motion abnormalities are noted.   Normal left ventricular diastolic function.   Mild mitral regurgitation.    EKG today   Marked sinus  Name band;

## 2024-02-26 ENCOUNTER — OFFICE VISIT (OUTPATIENT)
Dept: CARDIOLOGY CLINIC | Age: 59
End: 2024-02-26
Payer: COMMERCIAL

## 2024-02-26 VITALS
OXYGEN SATURATION: 98 % | HEIGHT: 72 IN | HEART RATE: 55 BPM | DIASTOLIC BLOOD PRESSURE: 66 MMHG | BODY MASS INDEX: 24.92 KG/M2 | WEIGHT: 184 LBS | SYSTOLIC BLOOD PRESSURE: 122 MMHG

## 2024-02-26 DIAGNOSIS — I77.9 BILATERAL CAROTID ARTERY DISEASE, UNSPECIFIED TYPE (HCC): ICD-10-CM

## 2024-02-26 DIAGNOSIS — R07.89 CHEST DISCOMFORT: ICD-10-CM

## 2024-02-26 DIAGNOSIS — I10 ESSENTIAL HYPERTENSION, BENIGN: ICD-10-CM

## 2024-02-26 DIAGNOSIS — I25.110 CORONARY ARTERY DISEASE INVOLVING NATIVE CORONARY ARTERY OF NATIVE HEART WITH UNSTABLE ANGINA PECTORIS (HCC): Primary | ICD-10-CM

## 2024-02-26 DIAGNOSIS — R94.31 ABNORMAL EKG: ICD-10-CM

## 2024-02-26 DIAGNOSIS — E78.2 MIXED HYPERLIPIDEMIA: ICD-10-CM

## 2024-02-26 PROCEDURE — 99214 OFFICE O/P EST MOD 30 MIN: CPT | Performed by: INTERNAL MEDICINE

## 2024-02-26 PROCEDURE — 93000 ELECTROCARDIOGRAM COMPLETE: CPT | Performed by: INTERNAL MEDICINE

## 2024-02-26 PROCEDURE — 3074F SYST BP LT 130 MM HG: CPT | Performed by: INTERNAL MEDICINE

## 2024-02-26 PROCEDURE — 3078F DIAST BP <80 MM HG: CPT | Performed by: INTERNAL MEDICINE

## 2024-02-26 RX ORDER — METOPROLOL SUCCINATE 25 MG/1
TABLET, EXTENDED RELEASE ORAL
Qty: 90 TABLET | Refills: 3 | Status: SHIPPED | OUTPATIENT
Start: 2024-02-26

## 2024-02-26 RX ORDER — ATORVASTATIN CALCIUM 40 MG/1
40 TABLET, FILM COATED ORAL DAILY
Qty: 90 TABLET | Refills: 3 | Status: SHIPPED | OUTPATIENT
Start: 2024-02-26

## 2024-02-26 RX ORDER — CLOPIDOGREL BISULFATE 75 MG/1
TABLET ORAL
Qty: 90 TABLET | Refills: 3 | Status: SHIPPED | OUTPATIENT
Start: 2024-02-26

## 2024-02-26 NOTE — PATIENT INSTRUCTIONS
Plan:    ~Recommend a stress echocardiogram   ~Carotid dopplers   Cardiac medications reviewed including indications and pertinent side effects. Medication list updated at this visit.   Check blood pressure and heart rate at home a few times per week- keep a log with dates and times and bring to office visit   Regular exercise and following a healthy diet encouraged   Follow up with me in 1 year   Call [facility] scheduling at [phone] to schedule testing

## 2024-04-02 ENCOUNTER — HOSPITAL ENCOUNTER (OUTPATIENT)
Dept: VASCULAR LAB | Age: 59
Discharge: HOME | End: 2024-04-02
Payer: COMMERCIAL

## 2024-04-02 DIAGNOSIS — I77.9 BILATERAL CAROTID ARTERY DISEASE, UNSPECIFIED TYPE (HCC): ICD-10-CM

## 2024-04-02 PROCEDURE — 93880 EXTRACRANIAL BILAT STUDY: CPT

## 2024-08-05 RX ORDER — ASPIRIN 81 MG/1
TABLET ORAL
Qty: 90 TABLET | Refills: 1 | Status: SHIPPED | OUTPATIENT
Start: 2024-08-05

## 2024-08-05 NOTE — TELEPHONE ENCOUNTER
Last ov: 02/26/24  Upcoming ov: None    BMP:05/18/23 Care Everywhere   CBC 05/18/23 Care Everywhere       [facility] [place]?

## 2025-05-27 DIAGNOSIS — Z79.899 MEDICATION MANAGEMENT: Primary | ICD-10-CM

## 2025-05-27 NOTE — TELEPHONE ENCOUNTER
Attempted to reach pt, left vm to call office back. Pt needs OV w/ [facility] and blood work.     Last Office Visit: 2/26/2024 [place] [facility] [place]  **Is provider [place]? No    Next Office Visit: Visit date not found Provider:   **If no OV, when does pt need to be seen? in 1 year(s)      Lab orders needed? yes - cmp, lipid,  lft  Encounter provider correct? Yes If not, change provider  Script changes since last refill? no    LAST LABS:   CMP:  Lab Results   Component Value Date     09/16/2020    K 4.6 09/16/2020     09/16/2020    CO2 27 09/16/2020    BUN 13 09/16/2020    CREATININE 1.1 09/16/2020    GLUCOSE 101 (H) 09/16/2020    CALCIUM 9.4 09/16/2020    BILITOT 0.8 09/15/2020    ALKPHOS 68 09/15/2020    AST 23 09/15/2020    ALT 16 09/15/2020    LABGLOM >60 09/16/2020    GFRAA >60 09/16/2020    AGRATIO 1.8 09/15/2020    GLOB 2.6 09/15/2020         Liver:  Lab Results   Component Value Date    ALT 16 09/15/2020    AST 23 09/15/2020    ALKPHOS 68 09/15/2020    BILITOT 0.8 09/15/2020     Lipid:  Lab Results   Component Value Date    CHOL 125 09/15/2020    TRIG 64 09/15/2020    HDL 71 (H) 09/15/2020    LDL 41 09/15/2020    VLDL 13 09/15/2020

## 2025-05-28 RX ORDER — METOPROLOL SUCCINATE 25 MG/1
TABLET, EXTENDED RELEASE ORAL
Qty: 90 TABLET | Refills: 3 | Status: SHIPPED | OUTPATIENT
Start: 2025-05-28

## 2025-05-28 RX ORDER — ATORVASTATIN CALCIUM 40 MG/1
40 TABLET, FILM COATED ORAL DAILY
Qty: 90 TABLET | Refills: 3 | Status: SHIPPED | OUTPATIENT
Start: 2025-05-28

## 2025-05-28 RX ORDER — CLOPIDOGREL BISULFATE 75 MG/1
75 TABLET ORAL DAILY
Qty: 90 TABLET | Refills: 3 | Status: SHIPPED | OUTPATIENT
Start: 2025-05-28